# Patient Record
Sex: MALE | Race: WHITE | Employment: OTHER | URBAN - METROPOLITAN AREA
[De-identification: names, ages, dates, MRNs, and addresses within clinical notes are randomized per-mention and may not be internally consistent; named-entity substitution may affect disease eponyms.]

---

## 2017-01-23 DIAGNOSIS — C61 MALIGNANT NEOPLASM OF PROSTATE (H): ICD-10-CM

## 2017-01-23 LAB — PSA SERPL-MCNC: NORMAL UG/L (ref 0–4)

## 2017-01-23 PROCEDURE — 84153 ASSAY OF PSA TOTAL: CPT | Performed by: UROLOGY

## 2017-01-23 PROCEDURE — 36415 COLL VENOUS BLD VENIPUNCTURE: CPT | Performed by: UROLOGY

## 2017-01-25 ENCOUNTER — TELEPHONE (OUTPATIENT)
Dept: FAMILY MEDICINE | Facility: CLINIC | Age: 77
End: 2017-01-25

## 2017-01-25 ENCOUNTER — OFFICE VISIT (OUTPATIENT)
Dept: UROLOGY | Facility: CLINIC | Age: 77
End: 2017-01-25
Payer: COMMERCIAL

## 2017-01-25 ENCOUNTER — RADIANT APPOINTMENT (OUTPATIENT)
Dept: GENERAL RADIOLOGY | Facility: CLINIC | Age: 77
End: 2017-01-25
Attending: UROLOGY
Payer: COMMERCIAL

## 2017-01-25 VITALS — DIASTOLIC BLOOD PRESSURE: 83 MMHG | SYSTOLIC BLOOD PRESSURE: 152 MMHG | RESPIRATION RATE: 14 BRPM | HEART RATE: 78 BPM

## 2017-01-25 DIAGNOSIS — R07.81 RIB PAIN ON LEFT SIDE: ICD-10-CM

## 2017-01-25 DIAGNOSIS — C61 MALIGNANT NEOPLASM OF PROSTATE (H): Primary | ICD-10-CM

## 2017-01-25 PROCEDURE — 99214 OFFICE O/P EST MOD 30 MIN: CPT | Performed by: UROLOGY

## 2017-01-25 PROCEDURE — 71101 X-RAY EXAM UNILAT RIBS/CHEST: CPT | Mod: LT

## 2017-01-25 NOTE — TELEPHONE ENCOUNTER
FYI:  Pt fell on ice on 1/21/17.  Saw  and was having rib pain.   did a CXR and 8th rib is fractured.  Having a fair amount of pain, not able to take a deep breathe and constipated since not able to push.  KpavelRN

## 2017-01-25 NOTE — PROGRESS NOTES
REASON FOR VISIT:  Prostate cancer.      BRIEF COURSE:  Mr. Jaxson Chaidez is a 76-year-old gentleman followed in our clinic for history of prostate cancer.  The patient underwent a robotic-assisted laparoscopic radical prostatectomy on 07/24/2015.  Final pathology demonstrated Shahla 3+4 equals 7, pT3a N0 MX disease with negative margins.  The patient's PSAs have been undetectable to date.  The patient comes in today noting that this past Saturday he fell on the ice while walking his dog.  He notes he has had severe pain in his left ribs posteriorly since that time.  The patient notes that he actually heard a popping sound when he fell.  He notes it hurts to take deep breaths.  He denies any king shortness of breath however.  He denies any gross hematuria.  The patient notes that he has been taking Aleve to help with this pain and that he iced his ribs for the first 3 days and in the last couple days has been using heat.  The patient also notes that he has been having increasing trouble passing his bowel movements due to the inability to generate significant abdominal pressure due to pain.  He has tried taking Citrucel and stool softeners, but is still struggling to have a bowel movement and is feeling uncomfortable from this.      PHYSICAL EXAMINATION:  His blood pressure is 152/83, pulse is 78.  He is in no acute distress but does look mildly uncomfortable.  Inspection however, is posterior back shows no bruising over the area of pain.  Palpation does cause some pain, but there is no palpable hematomas noted.      The patient's PSA from 01/23/2017 is undetectable      ASSESSMENT AND PLAN:  Over half of today's 25-minute visit was spent counseling the patient regarding his prostate cancer and his rib pain.  I suggested to Mr. Chaidez with regard to his prostate cancer are pleased to see his PSA remains undetectable.  We will go ahead and continue with our plan where the patient will recheck his PSA in another 3  months, but we will see him back physically at 6 months, which will be his 2-year anniversary.      In regard to the patient's ribs, I suggested to Mr. Chaidez I suspected he had a rib fracture and in fact we sent the patient down for a chest x-ray with rib series today which did confirm a fracture of the 8th posterior rib with some mild displacement.  I contacted the patient's primary doctor's office and notified them of Mr. Chaidez's rib fracture.  I also encouraged the patient to contact the office independently to let them know as well and to find out what kind of followup is required.        Further, for the patient's constipation, I suggested he try some magnesium citrate as a laxative to help open things up.      Lastly, the patient also notes that he has requested the [facility] to send a letter to us to get further documentation of his prostate cancer diagnosis, treatment and prognosis related to an Agent Orange disability claim he is filing through the [facility].  We will be happy to supply any records needed.         MAL CASE MD             D: 2017 11:05   T: 2017 14:35   MT:       Name:     MICHAEL CHAIDEZ   MRN:      [id number]        Account:      [id number]   :      1940           Visit Date:   2017      Document: [id number]

## 2017-01-25 NOTE — NURSING NOTE
"Chief Complaint   Patient presents with     RECHECK     6-mo follow up       Initial /83 mmHg  Pulse 78  Resp 14 Estimated body mass index is 31.42 kg/(m^2) as calculated from the following:    Height as of 2/15/16: 5' 10\" (1.778 m).    Weight as of 8/30/16: 219 lb (99.338 kg).  BP completed using cuff size: camron Karimi MA      "

## 2017-01-30 ENCOUNTER — MYC MEDICAL ADVICE (OUTPATIENT)
Dept: FAMILY MEDICINE | Facility: CLINIC | Age: 77
End: 2017-01-30

## 2017-01-30 NOTE — TELEPHONE ENCOUNTER
Does not need to be seen unless he is developing problems such as worsening shortness of breath, fever, cough that would suggest a pneumonia. Otherwise it just has to heal and that will take some time

## 2017-01-30 NOTE — TELEPHONE ENCOUNTER
Don states he is having pain, rated at 5 out of 10.   No trouble taking deep breaths at this time. Denies SOB.   Pain relief: taking nothing. Writer suggested aleve or advil if pt is able to tolerate.   States it is getting better.   Going out town to [facility] for a fishing trip on Thursday.   Would like to know if he should be seen or not?   Please advise. Thank you.     1/25/17 X-ray results  Compare 12/28/2014.     FINDINGS: Some aortic tortuosity again seen. Suboptimal inspiration  with mild bibasilar atelectasis, left greater than right. No  pneumothorax. Spine hyperostosis.                                                                       IMPRESSION: Mildly displaced fracture of the left eighth rib  posteriorly.

## 2017-03-14 ENCOUNTER — OFFICE VISIT (OUTPATIENT)
Dept: FAMILY MEDICINE | Facility: CLINIC | Age: 77
End: 2017-03-14
Payer: COMMERCIAL

## 2017-03-14 VITALS
BODY MASS INDEX: 32.17 KG/M2 | SYSTOLIC BLOOD PRESSURE: 124 MMHG | WEIGHT: 224.7 LBS | HEART RATE: 68 BPM | HEIGHT: 70 IN | DIASTOLIC BLOOD PRESSURE: 64 MMHG

## 2017-03-14 DIAGNOSIS — M70.62 TROCHANTERIC BURSITIS OF LEFT HIP: Primary | ICD-10-CM

## 2017-03-14 DIAGNOSIS — C61 MALIGNANT NEOPLASM OF PROSTATE (H): ICD-10-CM

## 2017-03-14 DIAGNOSIS — Z13.6 CARDIOVASCULAR SCREENING; LDL GOAL LESS THAN 160: ICD-10-CM

## 2017-03-14 LAB
ANION GAP SERPL CALCULATED.3IONS-SCNC: 4 MMOL/L (ref 3–14)
BUN SERPL-MCNC: 26 MG/DL (ref 7–30)
CALCIUM SERPL-MCNC: 8.5 MG/DL (ref 8.5–10.1)
CHLORIDE SERPL-SCNC: 102 MMOL/L (ref 94–109)
CHOLEST SERPL-MCNC: 214 MG/DL
CO2 SERPL-SCNC: 31 MMOL/L (ref 20–32)
CREAT SERPL-MCNC: 1.2 MG/DL (ref 0.66–1.25)
GFR SERPL CREATININE-BSD FRML MDRD: 59 ML/MIN/1.7M2
GLUCOSE SERPL-MCNC: 95 MG/DL (ref 70–99)
HDLC SERPL-MCNC: 54 MG/DL
LDLC SERPL CALC-MCNC: 140 MG/DL
NONHDLC SERPL-MCNC: 160 MG/DL
POTASSIUM SERPL-SCNC: 4.4 MMOL/L (ref 3.4–5.3)
SODIUM SERPL-SCNC: 137 MMOL/L (ref 133–144)
TRIGL SERPL-MCNC: 98 MG/DL

## 2017-03-14 PROCEDURE — 20610 DRAIN/INJ JOINT/BURSA W/O US: CPT | Performed by: FAMILY MEDICINE

## 2017-03-14 PROCEDURE — 36415 COLL VENOUS BLD VENIPUNCTURE: CPT | Performed by: FAMILY MEDICINE

## 2017-03-14 PROCEDURE — 80048 BASIC METABOLIC PNL TOTAL CA: CPT | Performed by: FAMILY MEDICINE

## 2017-03-14 PROCEDURE — 80061 LIPID PANEL: CPT | Performed by: FAMILY MEDICINE

## 2017-03-14 PROCEDURE — 99214 OFFICE O/P EST MOD 30 MIN: CPT | Mod: 25 | Performed by: FAMILY MEDICINE

## 2017-03-14 RX ORDER — TRIAMCINOLONE ACETONIDE 40 MG/ML
40 INJECTION, SUSPENSION INTRA-ARTICULAR; INTRAMUSCULAR ONCE
Qty: 1 ML | Refills: 0 | OUTPATIENT
Start: 2017-03-14 | End: 2017-03-14

## 2017-03-14 NOTE — PATIENT INSTRUCTIONS
This could take up to one week to achieve the full benefit, could be repeated in a month, but generally no more than 4 times in one year

## 2017-03-14 NOTE — PROGRESS NOTES
Jaxson,  Your cholesterol levels are actually better than they were 4 years ago and your metabolic panel is fine also      Fredi Bal MD

## 2017-03-14 NOTE — NURSING NOTE
"Chief Complaint   Patient presents with     Hip left     pain X 1-2 weeks. no known injury. pt. had a injury from falling on the ice. pt. broke a rib #8 on the left side.        Initial /64 (BP Location: Right arm, Patient Position: Chair, Cuff Size: Adult Large)  Pulse 68  Ht 5' 10\" (1.778 m)  Wt 224 lb 11.2 oz (101.9 kg)  BMI 32.24 kg/m2 Estimated body mass index is 32.24 kg/(m^2) as calculated from the following:    Height as of this encounter: 5' 10\" (1.778 m).    Weight as of this encounter: 224 lb 11.2 oz (101.9 kg).  Medication Reconciliation: complete     Mago Key, CMA      "

## 2017-03-14 NOTE — PROGRESS NOTES
Subjective:  Jaxson Chaidez is a 77 year old male   Chief Complaint   Patient presents with     Hip left     pain X 1-2 weeks. no known injury. pt. had a injury from falling on the ice. pt. broke a rib #8 on the left side.      About 7 weeks ago he slipped on some ice and fell on a chunk of frozen mud with an injury to his left lateral chest wall. He was seeing the urologist for follow-up of his prostate cancer shortly after that and an x-ray of the ribs was ordered which showed a fracture of his eighth rib. This was treated symptomatically and the urologist recommended that he follow-up with me. The pain in the chest wall has slowly gotten better so that just in the last week he's been able to sleep in his bed rather than a recliner. What brings him in today is he has been having pain in the left hip area, which is most bothersome when he first gets up in the morning, will lessen somewhat after he gets up and around. It is painful whether he is sitting or standing        Encounter Diagnoses   Name Primary?     Trochanteric bursitis of left hip Yes     CARDIOVASCULAR SCREENING; LDL GOAL LESS THAN 160      Malignant neoplasm of prostate (H)       ROS: 5 point ROS neg other than the symptoms noted above in the HPI.      Medical, surgical, social, and family histories, medications and allergies reviewed and updated.    Objective:  Exam:    GENERAL APPEARANCE ADULT: Alert, no acute distress  EYES: PERRL, EOM normal, conjunctiva and lids normal  RESP: lungs clear to auscultation , mild chest wall tenderness over the left eighth rib in the just lateral to the spine  CV: normal rate, regular rhythm, no murmur or gallop  MS: back exam: normal posture, moves about the exam room comfortably, tenderness to palpitation left low back area but this is mild  Left hip exam normal appearance, tenderness over greater trochanter, range of motion normal      ASSESSMENT:  1. Trochanteric bursitis of left hip , possibly brought on  by sleeping in the recliner for several weeks    2. CARDIOVASCULAR SCREENING; LDL GOAL LESS THAN 160    3. Malignant neoplasm of prostate (H) , stable with no evidence of metastatic disease to the bone at this time      Discussed pathophysiology of this condition and implications.  Questions answered.     PLAN:  Orders Placed This Encounter     DRAIN/INJECT LARGE JOINT/BURSA     KENALOG PER 10 MG     Lipid panel reflex to direct LDL     Basic metabolic panel     aspirin 81 MG tablet     triamcinolone acetonide (KENALOG-40) 40 MG/ML injection       After informed consent, the left trochanteric bursa area was prepped with Hibiclens and injected with 1 cc of 1/2 % bupivacaine and 40 mg of Kenalog.  This was well tolerated.     Patient Instructions     This could take up to one week to achieve the full benefit, could be repeated in a month, but generally no more than 4 times in one year

## 2017-03-14 NOTE — MR AVS SNAPSHOT
After Visit Summary   3/14/2017    Jaxson Chaidez    MRN: [id number]           Patient Information     Date Of Birth          1940        Visit Information        Provider Department      3/14/2017 8:40 AM Valeriano, LUCIANO Rai MD [facility]        Today's Diagnoses     Trochanteric bursitis of left hip    -  1      Care Instructions      This could take up to one week to achieve the full benefit, could be repeated in a month, but generally no more than 4 times in one year         Follow-ups after your visit        Your next 10 appointments already scheduled     Jul 26, 2017  9:00 AM CDT   Return Visit with Rk Stephenson MD   [facility]    [address] [address] [phone]              Who to contact     If you have questions or need follow up information about today's clinic visit or your schedule please contact [facility] directly at [phone].  Normal or non-critical lab and imaging results will be communicated to you by [employer], letter or phone within 4 business days after the clinic has received the results. If you do not hear from us within 7 days, please contact the clinic through [employer] or phone. If you have a critical or abnormal lab result, we will notify you by phone as soon as possible.  Submit refill requests through [employer] or call your pharmacy and they will forward the refill request to us. Please allow 3 business days for your refill to be completed.          Additional Information About Your Visit        [employer] Information     [employer] gives you secure access to your electronic health record. If you see a primary care provider, you can also send messages to your care team and make appointments. If you have questions, please call your primary care clinic.  If you do not have a primary care provider, please call [phone] and they will assist you.        Care  "EveryWhere ID     This is your Care EveryWhere ID. This could be used by other organizations to access your [facility] medical records  [id number]        Your Vitals Were     Pulse Height BMI (Body Mass Index)             68 5' 10\" (1.778 m) 32.24 kg/m2          Blood Pressure from Last 3 Encounters:   03/14/17 124/64   01/25/17 152/83   08/30/16 131/75    Weight from Last 3 Encounters:   03/14/17 224 lb 11.2 oz (101.9 kg)   08/30/16 219 lb (99.3 kg)   02/15/16 218 lb (98.9 kg)              Today, you had the following     No orders found for display       Primary Care Provider Office Phone # Fax #    R Fredi Bal [phone]       [place] [address]        Thank you!     Thank you for choosing [facility]  for your care. Our goal is always to provide you with excellent care. Hearing back from our patients is one way we can continue to improve our services. Please take a few minutes to complete the written survey that you may receive in the mail after your visit with us. Thank you!             Your Updated Medication List - Protect others around you: Learn how to safely use, store and throw away your medicines at www.disposemymeds.org.          This list is accurate as of: 3/14/17  9:19 AM.  Always use your most recent med list.                   Brand Name Dispense Instructions for use    ALEVE PO      Take 220 mg by mouth Reported on 3/14/2017       aspirin 81 MG tablet      Take 81 mg by mouth daily       flax seed oil 1000 MG capsule      Take 1 capsule by mouth two times daily.       MULTIVITAL PO      1 tablet by mouth daily         "

## 2017-03-17 ENCOUNTER — MYC MEDICAL ADVICE (OUTPATIENT)
Dept: FAMILY MEDICINE | Facility: CLINIC | Age: 77
End: 2017-03-17

## 2017-03-20 ENCOUNTER — MYC MEDICAL ADVICE (OUTPATIENT)
Dept: FAMILY MEDICINE | Facility: CLINIC | Age: 77
End: 2017-03-20

## 2017-03-20 NOTE — TELEPHONE ENCOUNTER
Dr. Post this is just an FYI.   Pt made an appt for Thursday, 3/23 @ 8:40 am   Thank you and have a nice day.   Cadence DAMIAN RN

## 2017-03-23 ENCOUNTER — OFFICE VISIT (OUTPATIENT)
Dept: FAMILY MEDICINE | Facility: CLINIC | Age: 77
End: 2017-03-23
Payer: COMMERCIAL

## 2017-03-23 ENCOUNTER — MYC MEDICAL ADVICE (OUTPATIENT)
Dept: FAMILY MEDICINE | Facility: CLINIC | Age: 77
End: 2017-03-23

## 2017-03-23 VITALS
DIASTOLIC BLOOD PRESSURE: 74 MMHG | HEART RATE: 60 BPM | HEIGHT: 70 IN | WEIGHT: 220.2 LBS | SYSTOLIC BLOOD PRESSURE: 124 MMHG | BODY MASS INDEX: 31.52 KG/M2

## 2017-03-23 DIAGNOSIS — M54.42 ACUTE MIDLINE LOW BACK PAIN WITH LEFT-SIDED SCIATICA: ICD-10-CM

## 2017-03-23 DIAGNOSIS — K59.09 OTHER CONSTIPATION: Primary | ICD-10-CM

## 2017-03-23 PROCEDURE — 99214 OFFICE O/P EST MOD 30 MIN: CPT | Performed by: FAMILY MEDICINE

## 2017-03-23 NOTE — TELEPHONE ENCOUNTER
Spoke with Elza,wife that she wasn't on his chart to talk too.  Asked if pt was there to get the ok to speak to her.  Pt not available.  Pt to call back to clinic if he has questions.  Jolene

## 2017-03-23 NOTE — PATIENT INSTRUCTIONS
Take one capful of Miralax daily with 8 oz of fluid. Stop the Citrucel and stool softeners. This should help with the abdominal bloating and bowel issues. The sciatica pain should settle down also.

## 2017-03-23 NOTE — PROGRESS NOTES
Subjective:  Jaxson Chaidez is a 77 year old male   Chief Complaint   Patient presents with     Hip left     ongoing pain pt. was seen in clinic 3/14/2017 to see Dr. Bal and had a cortisone injection. pt. states it is not any better.      He was here last week and his primary complaint was lateral hip pain at that time. He was given a steroid injection in the trochanteric bursa of the left hip. He states he is still not feeling well but on further questioning his primary complaint is some bloating and generalized abdominal discomfort. He states his bowel movements have been somewhat variable with occasional episodes of constipation. He has taken some stool softeners and Citrucel. He also has pain in the left sciatic area, not so much in the lateral hip area over the trochanteric bursa like he had last week. He admits to worrying about possible pancreatic cancer with the abdominal pain.      Encounter Diagnoses   Name Primary?     Other constipation Yes     Acute midline low back pain with left-sided sciatica         ROS: 5 point ROS neg other than the symptoms noted above in the HPI.      Medical, surgical, social, and family histories, medications and allergies reviewed and updated.    Objective:  Exam:    GENERAL APPEARANCE ADULT: alert, anxious  EYES: PERRL, EOM normal, conjunctiva and lids normal  RESP: lungs clear to auscultation   CV: normal rate, regular rhythm, no murmur or gallop  ABDOMEN: soft, no organomegaly, masses or tenderness  MS: back exam: normal posture, moves about the exam room comfortably, full ROM, tenderness to palpitation left sciatic notch area, straight leg raise right negative, straight leg raise left negative, reflexes at knees normal symmetrical, reflexes at ankles normal and symmetrical  Left hip exam normal appearance, minimal tenderness over the left trochanteric today; no significant pain or restriction with internal and external rotation either        ASSESSMENT:  1. Other  constipation    2. Acute midline low back pain with left-sided sciatica      I suspect that a lot of his abdominal bloating and discomfort is related to constipation and should respond to switching to Miralax. He has a long-standing history of low back pain with previous lumbar surgery and epidural steroid injections and well documented foraminal stenosis. I suspect his sciatica is related to this, but I think if we can settle down his abdominal symptoms the sciatica will be tolerable. If not, we could proceed with an updated CT scan of the lumbar spine and possible epidural steroid injection    PLAN:        Patient Instructions   Take one capful of Miralax daily with 8 oz of fluid. Stop the Citrucel and stool softeners. This should help with the abdominal bloating and bowel issues. The sciatica pain should settle down also.

## 2017-03-23 NOTE — NURSING NOTE
"Chief Complaint   Patient presents with     Hip left     ongoing pain pt. was seen in clinic 3/14/2017 to see Dr. Bal and had a cortisone injection. pt. states it is not any better.        Initial /74 (BP Location: Right arm, Patient Position: Chair, Cuff Size: Adult Large)  Pulse 60  Ht 5' 10\" (1.778 m)  Wt 220 lb 3.2 oz (99.9 kg)  BMI 31.6 kg/m2 Estimated body mass index is 31.6 kg/(m^2) as calculated from the following:    Height as of this encounter: 5' 10\" (1.778 m).    Weight as of this encounter: 220 lb 3.2 oz (99.9 kg).  Medication Reconciliation: complete     Mago Key, CMA      "

## 2017-04-06 ENCOUNTER — MYC MEDICAL ADVICE (OUTPATIENT)
Dept: FAMILY MEDICINE | Facility: CLINIC | Age: 77
End: 2017-04-06

## 2017-04-06 DIAGNOSIS — S22.32XG CLOSED FRACTURE OF ONE RIB OF LEFT SIDE WITH DELAYED HEALING, SUBSEQUENT ENCOUNTER: ICD-10-CM

## 2017-04-06 DIAGNOSIS — R07.82 INTERCOSTAL PAIN: ICD-10-CM

## 2017-04-06 DIAGNOSIS — M54.16 LUMBAR RADICULOPATHY: Primary | ICD-10-CM

## 2017-04-06 DIAGNOSIS — M48.061 LUMBAR SPINAL STENOSIS: ICD-10-CM

## 2017-04-06 NOTE — TELEPHONE ENCOUNTER
Dr. Bal  Please see [employer] message  LOV 3/23/17  OV notes 3/23/17:  suspect that a lot of his abdominal bloating and discomfort is related to constipation and should respond to switching to Miralax. He has a long-standing history of low back pain with previous lumbar surgery and epidural steroid injections and well documented foraminal stenosis. I suspect his sciatica is related to this, but I think if we can settle down his abdominal symptoms the sciatica will be tolerable. If not, we could proceed with an updated CT scan of the lumbar spine and possible epidural steroid injection    Please advise    Routing to provider.    Pham MOON Rn

## 2017-04-11 ENCOUNTER — MYC MEDICAL ADVICE (OUTPATIENT)
Dept: FAMILY MEDICINE | Facility: CLINIC | Age: 77
End: 2017-04-11

## 2017-04-11 ENCOUNTER — HOSPITAL ENCOUNTER (OUTPATIENT)
Dept: CT IMAGING | Facility: CLINIC | Age: 77
Discharge: HOME | End: 2017-04-11
Attending: FAMILY MEDICINE | Admitting: FAMILY MEDICINE
Payer: MEDICARE

## 2017-04-11 ENCOUNTER — HOSPITAL ENCOUNTER (OUTPATIENT)
Dept: CT IMAGING | Facility: CLINIC | Age: 77
End: 2017-04-11
Attending: FAMILY MEDICINE
Payer: MEDICARE

## 2017-04-11 DIAGNOSIS — M25.552 HIP PAIN, LEFT: Primary | ICD-10-CM

## 2017-04-11 DIAGNOSIS — R07.82 INTERCOSTAL PAIN: ICD-10-CM

## 2017-04-11 DIAGNOSIS — M54.16 LUMBAR RADICULOPATHY: ICD-10-CM

## 2017-04-11 DIAGNOSIS — S22.32XG CLOSED FRACTURE OF ONE RIB OF LEFT SIDE WITH DELAYED HEALING, SUBSEQUENT ENCOUNTER: ICD-10-CM

## 2017-04-11 DIAGNOSIS — M48.061 LUMBAR SPINAL STENOSIS: ICD-10-CM

## 2017-04-11 PROCEDURE — 71250 CT THORAX DX C-: CPT

## 2017-04-11 PROCEDURE — 72131 CT LUMBAR SPINE W/O DYE: CPT

## 2017-04-11 NOTE — PROGRESS NOTES
Jaxson,  The CT scan of the lumbar area shows perhaps some progression of the central stenosis at L3-4. Otherwise no significant change from the previous exam      Fredi Bal MD

## 2017-04-11 NOTE — PROGRESS NOTES
Jaxson,  The CT scan indicates that the rib fractures are healing, although the healing is somewhat slow which would explain your continued pain. Incidentally noted were some very small pulmonary nodules. Since you do not have a history of smoking, you are very low risk for this being anything of concern and no follow-up is needed      Fredi Bal MD

## 2017-04-12 ENCOUNTER — MYC MEDICAL ADVICE (OUTPATIENT)
Dept: FAMILY MEDICINE | Facility: CLINIC | Age: 77
End: 2017-04-12

## 2017-04-12 DIAGNOSIS — M46.1 SI JOINT ARTHRITIS (H): Primary | ICD-10-CM

## 2017-04-12 DIAGNOSIS — M46.1 SACROILIITIS, NOT ELSEWHERE CLASSIFIED (H): ICD-10-CM

## 2017-04-12 NOTE — TELEPHONE ENCOUNTER
Please see [facility] message  Please advise    Routing to provider.    Pham MOON Rn

## 2017-04-14 ENCOUNTER — HOSPITAL ENCOUNTER (OUTPATIENT)
Dept: CT IMAGING | Facility: CLINIC | Age: 77
Discharge: HOME | End: 2017-04-14
Attending: FAMILY MEDICINE | Admitting: FAMILY MEDICINE
Payer: MEDICARE

## 2017-04-14 DIAGNOSIS — M25.552 HIP PAIN, LEFT: ICD-10-CM

## 2017-04-14 PROCEDURE — 73700 CT LOWER EXTREMITY W/O DYE: CPT | Mod: LT

## 2017-04-14 NOTE — PROGRESS NOTES
Jaxson,  The CT scan showed the hip joint itself was normal. 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 I will put in a referral for an injection into the sacroiliac joint on the left. Call [phone] to schedule this      Fredi Bal MD

## 2017-04-20 ENCOUNTER — HOSPITAL ENCOUNTER (OUTPATIENT)
Dept: GENERAL RADIOLOGY | Facility: CLINIC | Age: 77
Discharge: HOME | End: 2017-04-20
Attending: FAMILY MEDICINE | Admitting: FAMILY MEDICINE
Payer: MEDICARE

## 2017-04-20 DIAGNOSIS — M46.1 SACROILIITIS, NOT ELSEWHERE CLASSIFIED (H): ICD-10-CM

## 2017-04-20 DIAGNOSIS — M46.1 SI JOINT ARTHRITIS (H): ICD-10-CM

## 2017-04-20 PROCEDURE — 27210202 XR SACROILIAC THERAPEUTIC INJECTION LEFT

## 2017-04-20 PROCEDURE — 25000128 H RX IP 250 OP 636: Performed by: RADIOLOGY

## 2017-04-20 PROCEDURE — S0020 INJECTION, BUPIVICAINE HYDRO: HCPCS | Performed by: RADIOLOGY

## 2017-04-20 PROCEDURE — 25000125 ZZHC RX 250: Performed by: RADIOLOGY

## 2017-04-20 RX ORDER — BUPIVACAINE HYDROCHLORIDE 2.5 MG/ML
5 INJECTION, SOLUTION EPIDURAL; INFILTRATION; INTRACAUDAL ONCE
Status: COMPLETED | OUTPATIENT
Start: 2017-04-20 | End: 2017-04-20

## 2017-04-20 RX ORDER — LIDOCAINE HYDROCHLORIDE 10 MG/ML
5 INJECTION, SOLUTION EPIDURAL; INFILTRATION; INTRACAUDAL; PERINEURAL ONCE
Status: COMPLETED | OUTPATIENT
Start: 2017-04-20 | End: 2017-04-20

## 2017-04-20 RX ORDER — METHYLPREDNISOLONE ACETATE 40 MG/ML
40 INJECTION, SUSPENSION INTRA-ARTICULAR; INTRALESIONAL; INTRAMUSCULAR; SOFT TISSUE ONCE
Status: COMPLETED | OUTPATIENT
Start: 2017-04-20 | End: 2017-04-20

## 2017-04-20 RX ADMIN — LIDOCAINE HYDROCHLORIDE 50 MG: 10 INJECTION, SOLUTION EPIDURAL; INFILTRATION; INTRACAUDAL; PERINEURAL at 09:05

## 2017-04-20 RX ADMIN — BUPIVACAINE HYDROCHLORIDE 25 MG: 2.5 INJECTION, SOLUTION EPIDURAL; INFILTRATION; INTRACAUDAL at 09:04

## 2017-04-20 RX ADMIN — METHYLPREDNISOLONE ACETATE 40 MG: 40 INJECTION, SUSPENSION INTRA-ARTICULAR; INTRALESIONAL; INTRAMUSCULAR; SOFT TISSUE at 09:04

## 2017-04-20 NOTE — PROGRESS NOTES
RADIOLOGY PROCEDURE NOTE  Patient name: Jaxson Chaidez  MRN: [id number]  : 1940    Pre-procedure diagnosis: Pain.  Post-procedure diagnosis: Same    Procedure Date/Time: 2017  9:41 AM  Procedure: Left SI joint steroid injection.  Estimated blood loss: None  Specimen(s) collected:  None.  The patient tolerated the procedure well with no immediate complications.    See imaging dictation for procedural details.    Provider name: Gold Feliciano  Assistant(s):None

## 2017-04-26 DIAGNOSIS — C61 MALIGNANT NEOPLASM OF PROSTATE (H): ICD-10-CM

## 2017-04-26 LAB — PSA SERPL-MCNC: NORMAL UG/L (ref 0–4)

## 2017-04-26 PROCEDURE — 36415 COLL VENOUS BLD VENIPUNCTURE: CPT | Performed by: UROLOGY

## 2017-04-26 PROCEDURE — 84153 ASSAY OF PSA TOTAL: CPT | Performed by: UROLOGY

## 2017-07-21 DIAGNOSIS — C61 MALIGNANT NEOPLASM OF PROSTATE (H): ICD-10-CM

## 2017-07-21 LAB — PSA SERPL-MCNC: NORMAL UG/L (ref 0–4)

## 2017-07-21 PROCEDURE — 84153 ASSAY OF PSA TOTAL: CPT | Performed by: UROLOGY

## 2017-07-21 PROCEDURE — 36415 COLL VENOUS BLD VENIPUNCTURE: CPT | Performed by: UROLOGY

## 2017-07-26 ENCOUNTER — OFFICE VISIT (OUTPATIENT)
Dept: UROLOGY | Facility: CLINIC | Age: 77
End: 2017-07-26
Payer: COMMERCIAL

## 2017-07-26 VITALS — RESPIRATION RATE: 16 BRPM | SYSTOLIC BLOOD PRESSURE: 151 MMHG | HEART RATE: 80 BPM | DIASTOLIC BLOOD PRESSURE: 93 MMHG

## 2017-07-26 DIAGNOSIS — C61 MALIGNANT NEOPLASM OF PROSTATE (H): Primary | ICD-10-CM

## 2017-07-26 PROCEDURE — 99213 OFFICE O/P EST LOW 20 MIN: CPT | Performed by: UROLOGY

## 2017-07-26 NOTE — NURSING NOTE
"Chief Complaint   Patient presents with     RECHECK     psa       Initial BP (!) 151/93 (BP Location: Left arm, Patient Position: Chair, Cuff Size: Adult Regular)  Pulse 80  Resp 16 Estimated body mass index is 31.6 kg/(m^2) as calculated from the following:    Height as of 3/23/17: 1.778 m (5' 10\").    Weight as of 3/23/17: 99.9 kg (220 lb 3.2 oz).  Medication Reconciliation: complete.   dmitriy sarkar LPN      "

## 2017-07-26 NOTE — PROGRESS NOTES
REASON FOR VISIT:  Prostate cancer.    History of Present Illness:  The patient is a 77-year-old gentleman followed in our clinic for a history of prostate cancer.  The patient underwent a robotic-assisted laparoscopic radical prostatectomy on 07/24/2015.  Final pathology demonstrated Papillion 3 + 4 equals 7, pT3a N0 MX disease with negative margins.  The patient's PSAs have been undetectable to date.  The patient comes in today noting that in general he continues to do well with no complaints at this time.    Physical examination:  His blood pressure is 151/93, pulse is 80.  He is in no acute distress.      Laboratory data:  He has a PSA from 07/21/2017, which is undetectable.    ASSESSMENT AND PLAN:  Over half of today's 15 minute visit was spent counseling the patient regarding his prostate cancer.  I discussed with the patient we are pleased to see that his PSA remains undetectable, now 2 years out from surgery.  We will continue to check PSAs every 6 months for the next couple of years.  The patient can simply call us for the results in 6 months from now for his next PSA, but then will see him back physically in 1 year for his next PSA after that.    The patient inquires if the [facility] has asked us for records with regard to his prostate cancer diagnosis in connection to Agent Orange.  We are unaware of any documents requesting information from us from the [facility] but we did go ahead and print out the patient's operative note, pathology report and last clinic visit note that he can use as documentation regarding his prostate cancer diagnosis.        Rk Stephenson MD    D:  07/26/2017 10:59 T:  07/26/2017 11:55  Document:  [id number] CW\

## 2017-07-26 NOTE — MR AVS SNAPSHOT
After Visit Summary   7/26/2017    Jaxson Chaidez    MRN: [id number]           Patient Information     Date Of Birth          1940        Visit Information        Provider Department      7/26/2017 9:00 AM Rk Stephenson MD [facility]        Today's Diagnoses     Malignant neoplasm of prostate (H)    -  1       Follow-ups after your visit        Your next 10 appointments already scheduled     Jul 26, 2018  9:00 AM CDT   Return Visit with Rk Stephenson MD   [facility]    [address] [address]   [phone]              Future tests that were ordered for you today     Open Future Orders        Priority Expected Expires Ordered    PSA tumor marker Routine 1/26/2018 7/26/2018 7/26/2017            Who to contact     If you have questions or need follow up information about today's clinic visit or your schedule please contact [facility] directly at [phone].  Normal or non-critical lab and imaging results will be communicated to you by AGNITiOhart, letter or phone within 4 business days after the clinic has received the results. If you do not hear from us within 7 days, please contact the clinic through [employer] or phone. If you have a critical or abnormal lab result, we will notify you by phone as soon as possible.  Submit refill requests through [employer] or call your pharmacy and they will forward the refill request to us. Please allow 3 business days for your refill to be completed.          Additional Information About Your Visit        AGNITiOhart Information     [employer] gives you secure access to your electronic health record. If you see a primary care provider, you can also send messages to your care team and make appointments. If you have questions, please call your primary care clinic.  If you do not have a primary care provider, please call [phone] and they will assist  you.        Care EveryWhere ID     This is your Care EveryWhere ID. This could be used by other organizations to access your [place] medical records  [id number]        Your Vitals Were     Pulse Respirations                80 16           Blood Pressure from Last 3 Encounters:   07/26/17 (!) 151/93   03/23/17 124/74   03/14/17 124/64    Weight from Last 3 Encounters:   03/23/17 99.9 kg (220 lb 3.2 oz)   03/14/17 101.9 kg (224 lb 11.2 oz)   08/30/16 99.3 kg (219 lb)               Primary Care Provider Office Phone # Fax #    R Fredi Bal [phone]       [place] [address]        Equal Access to Services     JOE JENSEN : Hadii jose rafael garcia hadasho Soomaali, waaxda luqadaha, qaybta kaalmada adeegyada, mariely olivarez. So [facility] [phone].    ATENCIÓN: Si habla español, tiene a barriga disposición servicios gratuitos de asistencia lingüística. Llame al [phone].    We comply with applicable federal civil rights laws and [place] laws. We do not discriminate on the basis of race, color, national origin, age, disability sex, sexual orientation or gender identity.            Thank you!     Thank you for choosing [facility]  for your care. Our goal is always to provide you with excellent care. Hearing back from our patients is one way we can continue to improve our services. Please take a few minutes to complete the written survey that you may receive in the mail after your visit with us. Thank you!             Your Updated Medication List - Protect others around you: Learn how to safely use, store and throw away your medicines at www.disposemymeds.org.          This list is accurate as of: 7/26/17 10:00 AM.  Always use your most recent med list.                   Brand Name Dispense Instructions for use Diagnosis    ALEVE PO      Take 220 mg by mouth Reported on 3/14/2017        aspirin 81 MG tablet      Take 81 mg by  mouth daily        flax seed oil 1000 MG capsule      Take 1 capsule by mouth two times daily.        MULTIVITAL PO      1 tablet by mouth daily

## 2017-12-04 ENCOUNTER — OFFICE VISIT (OUTPATIENT)
Dept: FAMILY MEDICINE | Facility: CLINIC | Age: 77
End: 2017-12-04
Payer: COMMERCIAL

## 2017-12-04 VITALS
SYSTOLIC BLOOD PRESSURE: 122 MMHG | WEIGHT: 215 LBS | HEART RATE: 72 BPM | DIASTOLIC BLOOD PRESSURE: 62 MMHG | BODY MASS INDEX: 30.85 KG/M2

## 2017-12-04 DIAGNOSIS — Z23 NEED FOR VACCINATION: ICD-10-CM

## 2017-12-04 DIAGNOSIS — Z12.11 SCREEN FOR COLON CANCER: ICD-10-CM

## 2017-12-04 DIAGNOSIS — R10.32 LLQ ABDOMINAL PAIN: Primary | ICD-10-CM

## 2017-12-04 PROCEDURE — 90471 IMMUNIZATION ADMIN: CPT | Performed by: FAMILY MEDICINE

## 2017-12-04 PROCEDURE — 90715 TDAP VACCINE 7 YRS/> IM: CPT | Performed by: FAMILY MEDICINE

## 2017-12-04 PROCEDURE — 99213 OFFICE O/P EST LOW 20 MIN: CPT | Mod: 25 | Performed by: FAMILY MEDICINE

## 2017-12-04 NOTE — NURSING NOTE
"Chief Complaint   Patient presents with     Abdominal Pain     pain in the right side, ? gallstones per x ray, problem with bowel movements, hemorrhoids off and on for some time now       Initial /62 (Cuff Size: Adult Large)  Pulse 72  Wt 215 lb (97.5 kg)  BMI 30.85 kg/m2 Estimated body mass index is 30.85 kg/(m^2) as calculated from the following:    Height as of 3/23/17: 5' 10\" (1.778 m).    Weight as of this encounter: 215 lb (97.5 kg).  Medication Reconciliation: complete   Silvana Card CMA      "

## 2017-12-04 NOTE — PATIENT INSTRUCTIONS
Thank you for choosing [facility].  You may be receiving a survey in the mail from [facility] regarding your visit today.  Please take a few minutes to complete and return the survey to let us know how we are doing.      Our Clinic hours are:  Mondays    7:20 am - 7 pm  Tues -  Fri  7:20 am - 5 pm    Clinic Phone: [phone]    The clinic lab opens at 7:30 am Mon - Fri and appointments are required.    [facility]. [phone]  Monday-Thursday 8 am - 7pm  Tues/Wed/Fri 8 am - 5:30 pm

## 2017-12-04 NOTE — MR AVS SNAPSHOT
After Visit Summary   12/4/2017    Jaxson Chaidez    MRN: [id number]           Patient Information     Date Of Birth          1940        Visit Information        Provider Department      12/4/2017 9:00 AM LUCIANO Bal MD [facility]        Today's Diagnoses     Need for vaccination    -  1    Screen for colon cancer        LLQ abdominal pain          Care Instructions          Thank you for choosing [facility].  You may be receiving a survey in the mail from [facility] Santiago regarding your visit today.  Please take a few minutes to complete and return the survey to let us know how we are doing.      Our Clinic hours are:  Mondays    7:20 am - 7 pm  Tues -  Fri  7:20 am - 5 pm    Clinic Phone: [phone]    The clinic lab opens at 7:30 am Mon - Fri and appointments are required.    [facility] [place]  Ph. [phone]  Monday-Thursday 8 am - 7pm  Tues/Wed/Fri 8 am - 5:30 pm                 Follow-ups after your visit        Additional Services     GASTROENTEROLOGY ADULT REF PROCEDURE ONLY       Last Lab Result: Creatinine (mg/dL)       Date                     Value                 03/14/2017               1.20             ----------  Body mass index is 30.85 kg/(m^2).     Needed:  No  Language:  English    Patient will be contacted to schedule procedure.     Please be aware that coverage of these services is subject to the terms and limitations of your health insurance plan.  Call member services at your health plan with any benefit or coverage questions.  Any procedures must be performed at a [facility] facility OR coordinated by your clinic's referral office.    Please bring the following with you to your appointment:    (1) Any X-Rays, CTs or MRIs which have been performed.  Contact the facility where they were done to arrange for  prior to your scheduled appointment.    (2) List of current medications   (3) This referral request   (4) Any  documents/labs given to you for this referral                  Your next 10 appointments already scheduled     Jul 26, 2018  9:00 AM CDT   Return Visit with Rk Stephenson MD   [facility]    [address] [address] [phone]              Who to contact     If you have questions or need follow up information about today's clinic visit or your schedule please contact [facility] directly at [phone].  Normal or non-critical lab and imaging results will be communicated to you by frentinghart, letter or phone within 4 business days after the clinic has received the results. If you do not hear from us within 7 days, please contact the clinic through [employer] or phone. If you have a critical or abnormal lab result, we will notify you by phone as soon as possible.  Submit refill requests through [employer] or call your pharmacy and they will forward the refill request to us. Please allow 3 business days for your refill to be completed.          Additional Information About Your Visit        frentinghart Information     [employer] gives you secure access to your electronic health record. If you see a primary care provider, you can also send messages to your care team and make appointments. If you have questions, please call your primary care clinic.  If you do not have a primary care provider, please call [phone] and they will assist you.        Care EveryWhere ID     This is your Care EveryWhere ID. This could be used by other organizations to access your [place] medical records  [id number]        Your Vitals Were     Pulse BMI (Body Mass Index)                72 30.85 kg/m2           Blood Pressure from Last 3 Encounters:   12/04/17 122/62   07/26/17 (!) 151/93   03/23/17 124/74    Weight from Last 3 Encounters:   12/04/17 215 lb (97.5 kg)   03/23/17 220 lb 3.2 oz (99.9 kg)   03/14/17 224 lb 11.2 oz (101.9 kg)              We  Performed the Following     1st  Administration  [98067]     GASTROENTEROLOGY ADULT REF PROCEDURE ONLY     TDAP VACCINE (ADACEL) [24795.002]        Primary Care Provider Office Phone # Fax #    R Fredi Bal [phone] [address] [address]        Equal Access to Services     ARYAOZ CAMILA : Hadii aad ku hadasho Soomaali, waaxda luqadaha, qaybta kaalmada adeegyada, waxay idiin hayaan adeeg michael laclairen ah. So [facility] [phone].    ATENCIÓN: Si habla español, tiene a barriga disposición servicios gratuitos de asistencia lingüística. Llame al [phone].    We comply with applicable federal civil rights laws and [place] laws. We do not discriminate on the basis of race, color, national origin, age, disability, sex, sexual orientation, or gender identity.            Thank you!     Thank you for choosing [facility]  for your care. Our goal is always to provide you with excellent care. Hearing back from our patients is one way we can continue to improve our services. Please take a few minutes to complete the written survey that you may receive in the mail after your visit with us. Thank you!             Your Updated Medication List - Protect others around you: Learn how to safely use, store and throw away your medicines at www.disposemymeds.org.          This list is accurate as of: 12/4/17  9:43 AM.  Always use your most recent med list.                   Brand Name Dispense Instructions for use Diagnosis    ALEVE PO      Take 220 mg by mouth Reported on 3/14/2017        aspirin 81 MG tablet      Take 81 mg by mouth daily        flax seed oil 1000 MG capsule      Take 1 capsule by mouth two times daily.        MULTIVITAL PO      1 tablet by mouth daily

## 2017-12-04 NOTE — PROGRESS NOTES
SUBJECTIVE:   Jaxson Chaidez is a 77 year old male who presents to clinic today for the following health issues:      Chief Complaint   Patient presents with     Abdominal Pain     pain in the left side, ? gallstones per x ray, problem with bowel movements, hemorrhoids off and on for some time now     He describes a moderate discomfort in the left lower quadrant that is sometimes worse after eating and has been persistent. He was noted incidentally to have some gallstones on the CT scan a while ago but the gallbladder otherwise appeared normal. He's never had colicky right upper quadrant pain or epigastric pain to suggest gallbladder disease. He states that his bowel movements are fairly formed and soft and his appetite has been good. He had a colonoscopy 9 years ago which was normal and there was no mention of diverticulosis. However he's had a brother who had several colon polyps removed. He admits to being a little concerned about this and is requesting to have a colonoscopy done earlier than scheduled to evaluate this          Problem list and histories reviewed & adjusted, as indicated.  Additional history: none        Reviewed and updated as needed this visit by clinical staff  Tobacco  Allergies  Meds  Med Hx  Surg Hx  Fam Hx  Soc Hx      Reviewed and updated as needed this visit by Provider               ROS:  Constitutional, HEENT, cardiovascular, pulmonary, gi and gu systems are negative, except as otherwise noted.          OBJECTIVE:                                                    /62 (Cuff Size: Adult Large)  Pulse 72  Wt 215 lb (97.5 kg)  BMI 30.85 kg/m2    GENERAL: healthy, alert and no distress  EYES: Eyes grossly normal to inspection, extraocular movements - intact, and PERRL  NECK: no tenderness, no adenopathy, no asymmetry, no masses, no stiffness; thyroid- normal to palpation  RESP: lungs clear to auscultation - no rales, no rhonchi, no wheezes  CV: regular rates and  rhythm, normal S1 S2, no S3 or S4 and no murmur, no click or rub -  ABDOMEN: Mild tenderness left lower quadrant, no mass, guarding, rigidity, or rebound; Upper abdominal exam is benign  MS: extremities- no gross deformities noted, no edema         ASSESSMENT/PLAN:                                                    ASSESSMENT:  1. LLQ abdominal pain    2. Screen for colon cancer    3. Need for vaccination        PLAN:  Orders Placed This Encounter     TDAP VACCINE (ADACEL) [40924.002]     1st  Administration  [[id number]]     GASTROENTEROLOGY ADULT REF PROCEDURE ONLY for colonoscopy        Patient Instructions     Schedule colonoscopy when you can    Thank you for choosing [facility].  You may be receiving a survey in the mail from [employer] regarding your visit today.  Please take a few minutes to complete and return the survey to let us know how we are doing.      Our Clinic hours are:  Mondays    7:20 am - 7 pm  Tues -  Fri  7:20 am - 5 pm    Clinic Phone: [phone]    The clinic lab opens at 7:30 am Mon - Fri and appointments are required.    [facility] [place]  Ph. [phone]  Monday-Thursday 8 am - 7pm  Tues/Wed/Fri 8 am - 5:30 pm             LUCIANO Rai Post  [facility]

## 2018-01-02 ENCOUNTER — ANESTHESIA EVENT (OUTPATIENT)
Dept: GASTROENTEROLOGY | Facility: CLINIC | Age: 78
End: 2018-01-02
Payer: MEDICARE

## 2018-01-02 NOTE — ANESTHESIA PREPROCEDURE EVALUATION
Anesthesia Evaluation     . Pt has had prior anesthetic. Type: General and MAC           ROS/MED HX    ENT/Pulmonary:  - neg pulmonary ROS     Neurologic:  - neg neurologic ROS   (+)other neuro hearing loss - cochlear implant    Cardiovascular:  - neg cardiovascular ROS   (+) ----. : . . . :. . Previous cardiac testing date:results:Stress Testdate:3/2014 results:Order     NM Exercise stress test (nuc card) ([id number]) (Order [id number])      Exam Information     Exam Date Exam Time Accession # Performing Department Results     3/12/14  2:31 PM [id number] [facility] Nuclear Medicine      [employer] Interactive Report and InfoRx     View the interactive report     PACS Images     Show images for NM Exercise stress test (nuc card)     Study Result     GATED EXERCISE MYOCARDIAL PERFUSION SCINTIGRAPHY       3/12/2014 2:31 PM ,MICHAEL HEARD 74 years Male. 1940        Indication/Clinical History: Chest pain     Impression     I   Exercise Test       1.  The patient exercised to a adequate cardiac workload with                  Good functional aerobic capacity demonstrated.       2. The patient experienced no chest pain during the test.       3. EKG report done under separate cover.     II    Myocardial Perfusion Scintigraphy  1.  Myocardial perfusion imaging using single isotope technique  demonstrated normal myocardial perfusion.   2. Gated images demonstrated normal wall motion.  The left ventricular  systolic function is normal with a calculated ejection fraction of  64%.     Procedure     The patient performed treadmill exercise using a Chidi protocol,  completing 7 minutes and 32 seconds with an estimated workload of 9.3  METS.  The test was terminated due to fatigue. The heart rate was 60  beats per minute at baseline and increased to 151 beats at peak  exercise, which was 102% of the maximum predicted heart rate. The rest  blood pressure was 140/80 mm/Hg and peak blood pressure is  180/82mm/Hg  with rate pressure product of 27,000. The patient experienced no chest  pain during the test. The patient was not on a beta blocker.     Myocardial perfusion imaging was performed at rest, approximately 45  minutes after the injection intravenously of 10.6of Tc-99m Myoview. At  peak exercise, the patient was injected intravenously with 32.9 mCi of   Tc-99m Myoview and exercise continued for approximately 1 minute.  Gated post-stress tomographic imaging was performed approximately 30  minutes after stress     EKG Findings  EKG report done under separate cover.     Tomographic Findings  Overall, the study quality is good . On the stress images, no  significant perfusion defects were seen. On the rest images,perfusion  was normal . Gated images demonstrated normal wall motion. The left  ventricular ejection fraction was calculated to be 64%. TID was  absent.              TARIQ PACK MD    ECG reviewed date:7/2014 results:SR date: results:          METS/Exercise Tolerance:  >4 METS   Hematologic:  - neg hematologic  ROS       Musculoskeletal:   (+) , , other musculoskeletal- cervicalgia , left knee DJD --s/p TKA      GI/Hepatic:  - neg GI/hepatic ROS      (-) liver disease   Renal/Genitourinary:  - ROS Renal section negative       Endo:  - neg endo ROS       Psychiatric:         Infectious Disease:  - neg infectious disease ROS       Malignancy:   (+) Malignancy History of Prostate  Prostate CA status post Surgery,         Other:    - neg other ROS                 Physical Exam  Normal systems: cardiovascular, pulmonary and dental    Airway   Mallampati: II    Dental     Cardiovascular       Pulmonary                     Anesthesia Plan      History & Physical Review  History and physical reviewed and following examination; no interval change.    ASA Status:  2 .    NPO Status:  > 6 hours    Plan for MAC Reason for MAC:  Deep or markedly invasive procedure (G8)         Postoperative  Care      Consents  Anesthetic plan, risks, benefits and alternatives discussed with:  Patient..                          .

## 2018-01-12 ENCOUNTER — HOSPITAL ENCOUNTER (OUTPATIENT)
Facility: CLINIC | Age: 78
Discharge: HOME | End: 2018-01-12
Attending: SURGERY | Admitting: SURGERY
Payer: MEDICARE

## 2018-01-12 ENCOUNTER — SURGERY (OUTPATIENT)
Age: 78
End: 2018-01-12
Payer: COMMERCIAL

## 2018-01-12 ENCOUNTER — ANESTHESIA (OUTPATIENT)
Dept: GASTROENTEROLOGY | Facility: CLINIC | Age: 78
End: 2018-01-12
Payer: MEDICARE

## 2018-01-12 VITALS
SYSTOLIC BLOOD PRESSURE: 162 MMHG | TEMPERATURE: 97.8 F | WEIGHT: 200 LBS | RESPIRATION RATE: 18 BRPM | HEART RATE: 73 BPM | DIASTOLIC BLOOD PRESSURE: 84 MMHG | BODY MASS INDEX: 28.63 KG/M2 | OXYGEN SATURATION: 97 % | HEIGHT: 70 IN

## 2018-01-12 LAB — COLONOSCOPY: NORMAL

## 2018-01-12 PROCEDURE — 37000009 ZZH ANESTHESIA TECHNICAL FEE, EACH ADDTL 15 MIN: Performed by: SURGERY

## 2018-01-12 PROCEDURE — 88305 TISSUE EXAM BY PATHOLOGIST: CPT | Mod: 26 | Performed by: SURGERY

## 2018-01-12 PROCEDURE — 45384 COLONOSCOPY W/LESION REMOVAL: CPT | Mod: PT | Performed by: SURGERY

## 2018-01-12 PROCEDURE — 45384 COLONOSCOPY W/LESION REMOVAL: CPT | Performed by: SURGERY

## 2018-01-12 PROCEDURE — 37000008 ZZH ANESTHESIA TECHNICAL FEE, 1ST 30 MIN: Performed by: SURGERY

## 2018-01-12 PROCEDURE — 25000128 H RX IP 250 OP 636: Performed by: NURSE ANESTHETIST, CERTIFIED REGISTERED

## 2018-01-12 PROCEDURE — 25000125 ZZHC RX 250: Performed by: NURSE ANESTHETIST, CERTIFIED REGISTERED

## 2018-01-12 PROCEDURE — 25000125 ZZHC RX 250: Performed by: SURGERY

## 2018-01-12 PROCEDURE — 88305 TISSUE EXAM BY PATHOLOGIST: CPT | Performed by: SURGERY

## 2018-01-12 PROCEDURE — 25000128 H RX IP 250 OP 636: Performed by: SURGERY

## 2018-01-12 RX ORDER — GLYCOPYRROLATE 0.2 MG/ML
INJECTION, SOLUTION INTRAMUSCULAR; INTRAVENOUS PRN
Status: DISCONTINUED | OUTPATIENT
Start: 2018-01-12 | End: 2018-01-12

## 2018-01-12 RX ORDER — SODIUM CHLORIDE, SODIUM LACTATE, POTASSIUM CHLORIDE, CALCIUM CHLORIDE 600; 310; 30; 20 MG/100ML; MG/100ML; MG/100ML; MG/100ML
INJECTION, SOLUTION INTRAVENOUS CONTINUOUS
Status: DISCONTINUED | OUTPATIENT
Start: 2018-01-12 | End: 2018-01-12 | Stop reason: HOSPADM

## 2018-01-12 RX ORDER — LIDOCAINE 40 MG/G
CREAM TOPICAL
Status: DISCONTINUED | OUTPATIENT
Start: 2018-01-12 | End: 2018-01-12 | Stop reason: HOSPADM

## 2018-01-12 RX ORDER — PROPOFOL 10 MG/ML
INJECTION, EMULSION INTRAVENOUS PRN
Status: DISCONTINUED | OUTPATIENT
Start: 2018-01-12 | End: 2018-01-12

## 2018-01-12 RX ORDER — ONDANSETRON 2 MG/ML
4 INJECTION INTRAMUSCULAR; INTRAVENOUS
Status: DISCONTINUED | OUTPATIENT
Start: 2018-01-12 | End: 2018-01-12 | Stop reason: HOSPADM

## 2018-01-12 RX ORDER — PROPOFOL 10 MG/ML
INJECTION, EMULSION INTRAVENOUS CONTINUOUS PRN
Status: DISCONTINUED | OUTPATIENT
Start: 2018-01-12 | End: 2018-01-12

## 2018-01-12 RX ADMIN — GLYCOPYRROLATE 0.2 MG: 0.2 INJECTION, SOLUTION INTRAMUSCULAR; INTRAVENOUS at 10:06

## 2018-01-12 RX ADMIN — SODIUM CHLORIDE, POTASSIUM CHLORIDE, SODIUM LACTATE AND CALCIUM CHLORIDE: 600; 310; 30; 20 INJECTION, SOLUTION INTRAVENOUS at 08:33

## 2018-01-12 RX ADMIN — LIDOCAINE HYDROCHLORIDE 5 ML: 10 INJECTION, SOLUTION EPIDURAL; INFILTRATION; INTRACAUDAL; PERINEURAL at 08:34

## 2018-01-12 RX ADMIN — PROPOFOL 30 MG: 10 INJECTION, EMULSION INTRAVENOUS at 10:06

## 2018-01-12 RX ADMIN — PROPOFOL 30 MG: 10 INJECTION, EMULSION INTRAVENOUS at 10:07

## 2018-01-12 RX ADMIN — PROPOFOL 10 MG: 10 INJECTION, EMULSION INTRAVENOUS at 10:08

## 2018-01-12 RX ADMIN — PROPOFOL 30 MG: 10 INJECTION, EMULSION INTRAVENOUS at 10:05

## 2018-01-12 RX ADMIN — PROPOFOL 200 MCG/KG/MIN: 10 INJECTION, EMULSION INTRAVENOUS at 10:08

## 2018-01-12 NOTE — BRIEF OP NOTE
[facility]   Brief Operative Note    Pre-operative diagnosis: screening   Post-operative diagnosis diverticulosis, one polyp   Procedure: Procedure(s):  colonoscopy - Wound Class: II-Clean Contaminated   Surgeon(s): Surgeon(s) and Role:     * Christopher Ron MD - Primary   Estimated blood loss: * No values recorded between 1/12/2018 12:00 AM and 1/12/2018 10:30 AM *    Specimens:   ID Type Source Tests Collected by Time Destination   A : Rectal Polyp Polyp Large Intestine, Rectum SURGICAL PATHOLOGY EXAM Christopher Ron MD 1/12/2018 10:25 AM       Findings: 1. Mild sigmoid diverticulosis  2. Single rectal polyp - biopsied and cauterized  3. Colon otherwise normal

## 2018-01-12 NOTE — ANESTHESIA POSTPROCEDURE EVALUATION
Patient: Jaxson Chaidez    Procedure(s):  colonoscopy - Wound Class: II-Clean Contaminated    Diagnosis:screening  Diagnosis Additional Information: No value filed.    Anesthesia Type:  MAC    Note:  Anesthesia Post Evaluation    Patient location during evaluation: Phase 2 and Bedside  Patient participation: Able to fully participate in evaluation  Level of consciousness: awake  Pain management: adequate  Airway patency: patent  Cardiovascular status: acceptable  Respiratory status: acceptable  Hydration status: acceptable  PONV: none     Anesthetic complications: None          Last vitals:  Vitals:    01/12/18 0825 01/12/18 1037   BP: 134/82 113/80   Pulse: 73    Resp: 16 16   Temp: 36.6  C (97.8  F)    SpO2: 97% 97%         Electronically Signed By: Abhijeet Montez CRNA, APRN CRNA  January 12, 2018  10:42 AM

## 2018-01-12 NOTE — ANESTHESIA CARE TRANSFER NOTE
Patient: Jaxson Chaidez    Procedure(s):  colonoscopy - Wound Class: II-Clean Contaminated    Diagnosis: screening  Diagnosis Additional Information: No value filed.    Anesthesia Type:   MAC     Note:  Airway :Room Air  Patient transferred to:Phase II  Handoff Report: Identifed the Patient, Identified the Reponsible Provider, Reviewed the pertinent medical history, Discussed the surgical course, Reviewed Intra-OP anesthesia mangement and issues during anesthesia, Set expectations for post-procedure period and Allowed opportunity for questions and acknowledgement of understanding      Vitals: (Last set prior to Anesthesia Care Transfer)    CRNA VITALS  1/12/2018 1002 - 1/12/2018 1042      1/12/2018             Pulse: 61    Ht Rate: 63    SpO2: 99 %                Electronically Signed By: Abhijeet Montez CRNA, APRN CRNA  January 12, 2018  10:42 AM

## 2018-01-12 NOTE — H&P
"77 year old year old male here for colonoscopy for screening.    Patient Active Problem List   Diagnosis     Hearing loss     Cervicalgia     CARDIOVASCULAR SCREENING; LDL GOAL LESS THAN 160     Advanced directives, counseling/discussion     Malignant neoplasm of prostate (H)     Status post total left knee replacement     Left knee DJD     Senile nuclear sclerosis     Prostate cancer (H)       Past Medical History:   Diagnosis Date     Arthritis      Prostate cancer (H)        Past Surgical History:   Procedure Laterality Date     ARTHROPLASTY KNEE Left 2/25/2015    Procedure: ARTHROPLASTY KNEE;  Surgeon: Emery Lincoln MD;  Location: [facility] OR     BIOPSY      Prostate     COLONOSCOPY       DAVINCI LYMPHADENECTOMY Bilateral 7/24/2015    Procedure: DAVINCI LYMPHADENECTOMY;  Surgeon: Rk Stephenson MD;  Location: [facility] OR     DAVINCI PROSTATECTOMY N/A 7/24/2015    Procedure: DAVINCI PROSTATECTOMY;  Surgeon: Rk Stephenson MD;  Location:  OR     ENT SURGERY Right 2007    cochlear implant     INJECT EPIDURAL LUMBAR  3/1/2011    INJECT EPIDURAL LUMBAR performed by GENERIC ANESTHESIA PROVIDER at [facility] OR     PHACOEMULSIFICATION WITH STANDARD INTRAOCULAR LENS IMPLANT Left 5/21/2015    Procedure: PHACOEMULSIFICATION WITH STANDARD INTRAOCULAR LENS IMPLANT;  Surgeon: Romero Funk MD;  Location: [facility] OR     SURGICAL HISTORY OF -   01/21/2002    [facility]       [facility]    No current outpatient prescriptions on file.       No Known Allergies    Pt reports that he has never smoked. He has never used smokeless tobacco. He reports that he drinks alcohol. He reports that he does not use illicit drugs.    Exam:  /82 (BP Location: Right arm)  Pulse 73  Temp 97.8  F (36.6  C) (Oral)  Resp 16  Ht 1.778 m (5' 10\")  Wt 90.7 kg (200 lb)  SpO2 97%  BMI 28.7 kg/m2    Awake, Alert OX3  Lungs - CTA bilaterally  CV - RRR, no murmurs, distal pulses intact  Abd - soft, non-distended, non-tender, +BS  Extr " - No cyanosis or edema    A/P 77 year old year old male in need of colonoscopy for screening. Risks, benefits, alternatives, and complications were discussed including the possibility of perforation and the patient agreed to proceed    Christopher Ron MD

## 2018-01-15 LAB — COPATH REPORT: NORMAL

## 2018-01-26 DIAGNOSIS — C61 MALIGNANT NEOPLASM OF PROSTATE (H): ICD-10-CM

## 2018-01-26 LAB — PSA SERPL-MCNC: <0.01 UG/L (ref 0–4)

## 2018-01-26 PROCEDURE — 84153 ASSAY OF PSA TOTAL: CPT | Performed by: UROLOGY

## 2018-01-26 PROCEDURE — 36415 COLL VENOUS BLD VENIPUNCTURE: CPT | Performed by: UROLOGY

## 2018-07-19 ENCOUNTER — DOCUMENTATION ONLY (OUTPATIENT)
Dept: LAB | Facility: CLINIC | Age: 78
End: 2018-07-19

## 2018-07-19 NOTE — PROGRESS NOTES
This patient is coming in on 2018 for a PSA. His standing ordered has . Please place a new standing order. Thank you.

## 2018-07-20 DIAGNOSIS — Z85.46 PERSONAL HISTORY OF MALIGNANT NEOPLASM OF PROSTATE: Primary | ICD-10-CM

## 2018-07-20 DIAGNOSIS — Z85.46 PERSONAL HISTORY OF MALIGNANT NEOPLASM OF PROSTATE: ICD-10-CM

## 2018-07-20 LAB — PSA SERPL-MCNC: <0.01 UG/L (ref 0–4)

## 2018-07-20 PROCEDURE — 36415 COLL VENOUS BLD VENIPUNCTURE: CPT | Performed by: UROLOGY

## 2018-07-20 PROCEDURE — 84153 ASSAY OF PSA TOTAL: CPT | Performed by: UROLOGY

## 2018-07-26 ENCOUNTER — OFFICE VISIT (OUTPATIENT)
Dept: UROLOGY | Facility: CLINIC | Age: 78
End: 2018-07-26
Payer: COMMERCIAL

## 2018-07-26 VITALS — RESPIRATION RATE: 16 BRPM | HEART RATE: 69 BPM | DIASTOLIC BLOOD PRESSURE: 86 MMHG | SYSTOLIC BLOOD PRESSURE: 149 MMHG

## 2018-07-26 DIAGNOSIS — C61 MALIGNANT NEOPLASM OF PROSTATE (H): Primary | ICD-10-CM

## 2018-07-26 PROCEDURE — 99213 OFFICE O/P EST LOW 20 MIN: CPT | Performed by: UROLOGY

## 2018-07-26 NOTE — MR AVS SNAPSHOT
After Visit Summary   7/26/2018    Jaxson Chaidez    MRN: [id number]           Patient Information     Date Of Birth          1940        Visit Information        Provider Department      7/26/2018 9:00 AM Rk Stephenson MD [facility]        Today's Diagnoses     Malignant neoplasm of prostate (H)    -  1       Follow-ups after your visit        Your next 10 appointments already scheduled     Jul 25, 2019  9:00 AM CDT   Return Visit with Rk Stephenson MD   [facility] ([facility])    [address] [address]   [phone]              Who to contact     If you have questions or need follow up information about today's clinic visit or your schedule please contact [facility] directly at [phone].  Normal or non-critical lab and imaging results will be communicated to you by MyChart, letter or phone within 4 business days after the clinic has received the results. If you do not hear from us within 7 days, please contact the clinic through [employer] or phone. If you have a critical or abnormal lab result, we will notify you by phone as soon as possible.  Submit refill requests through [facility] or call your pharmacy and they will forward the refill request to us. Please allow 3 business days for your refill to be completed.          Additional Information About Your Visit        MyChart Information     [facility] gives you secure access to your electronic health record. If you see a primary care provider, you can also send messages to your care team and make appointments. If you have questions, please call your primary care clinic.  If you do not have a primary care provider, please call [phone] and they will assist you.        Care EveryWhere ID     This is your Care EveryWhere ID. This could be used by other organizations to access your [facility] medical records  [id number]        Your  Vitals Were     Pulse Respirations                69 16           Blood Pressure from Last 3 Encounters:   07/26/18 149/86   01/12/18 162/84   12/04/17 122/62    Weight from Last 3 Encounters:   01/12/18 90.7 kg (200 lb)   12/04/17 97.5 kg (215 lb)   03/23/17 99.9 kg (220 lb 3.2 oz)               Primary Care Provider Office Phone # Fax #    R Fredi Bal [phone] [address] [facility] [address]        Equal Access to Services     [facility] : Hadii aad ku hadasho Soomaali, waaxda luqadaha, qaybta kaalmada tatyana, mariely ivan . So [place] -[phone].    ATENCIÓN: Si habla español, tiene a barriga disposición servicios gratuitos de asistencia lingüística. Ll[facility] [phone].    We comply with applicable federal civil rights laws and [place] laws. We do not discriminate on the basis of race, color, national origin, age, disability, sex, sexual orientation, or gender identity.            Thank you!     Thank you for choosing [facility]  for your care. Our goal is always to provide you with excellent care. Hearing back from our patients is one way we can continue to improve our services. Please take a few minutes to complete the written survey that you may receive in the mail after your visit with us. Thank you!             Your Updated Medication List - Protect others around you: Learn how to safely use, store and throw away your medicines at www.disposemymeds.org.          This list is accurate as of 7/26/18 11:59 PM.  Always use your most recent med list.                   Brand Name Dispense Instructions for use Diagnosis    ALEVE PO      Take 220 mg by mouth Reported on 3/14/2017        aspirin 81 MG tablet      Take 81 mg by mouth daily        flax seed oil 1000 MG capsule      Take 1 capsule by mouth two times daily.        MULTIVITAL PO      1 tablet by mouth daily

## 2018-07-26 NOTE — PROGRESS NOTES
Visit Date:   2018      REASON FOR VISIT:  Prostate cancer.      BRIEF COURSE:  Mr. Chaidez is a 78-year-old gentleman followed in our clinic for a history of prostate cancer.  The patient underwent a robotic-assisted laparoscopic radical prostatectomy on 2015.  Final pathology demonstrates Shahla 3+4=7, [id number] disease with negative margins.  The patient's PSAs have been undetectable to date.  The patient comes in today noting he continues to do well.  He has no complaints currently.      PHYSICAL EXAMINATION:   VITAL SIGNS:  His blood pressure is 149/86, pulse is 69.   GENERAL:  He is in no acute distress.      LABORATORY DATA:  His PSA from 2018 is undetectable      ASSESSMENT AND PLAN:  Over half of today's 15-minute visit was spent counseling the patient regarding his prostate cancer.  I suggested to Mr. Chaidez we are pleased to see his PSA remains undetectable.  We are now 3 years out from the patient's surgery.  We will plan on continuing to check PSAs every 6 months for the next year and then we will go to annual testing.  The patient will give us a call to go over his PSA results in 6 months and we will see the patient back physically in a year from now with another PSA.  After that, the patient will be able to follow with his primary doctor for annual PSA checks if he is so inclined or of course, he can always continue to follow with us, but we will give the patient the option a year 4.         MAL CASE MD             D: 2018   T: 2018   MT: DEYANIRA      Name:     MICHAEL CHAIDEZ   MRN:      [id number]        Account:      [id number]   :      1940           Visit Date:   2018      Document: [id number]

## 2018-07-26 NOTE — NURSING NOTE
"Initial /86 (BP Location: Left arm, Patient Position: Chair, Cuff Size: Adult Regular)  Pulse 69  Resp 16 Estimated body mass index is 28.7 kg/(m^2) as calculated from the following:    Height as of 1/12/18: 1.778 m (5' 10\").    Weight as of 1/12/18: 90.7 kg (200 lb). .    Patient is here for a recheck for psa..   dmitriy sarkar LPN    "

## 2018-12-11 ENCOUNTER — OFFICE VISIT (OUTPATIENT)
Dept: FAMILY MEDICINE | Facility: CLINIC | Age: 78
End: 2018-12-11
Payer: COMMERCIAL

## 2018-12-11 VITALS
DIASTOLIC BLOOD PRESSURE: 78 MMHG | WEIGHT: 214 LBS | TEMPERATURE: 98.3 F | HEIGHT: 70 IN | RESPIRATION RATE: 18 BRPM | BODY MASS INDEX: 30.64 KG/M2 | SYSTOLIC BLOOD PRESSURE: 140 MMHG | OXYGEN SATURATION: 97 % | HEART RATE: 72 BPM

## 2018-12-11 DIAGNOSIS — M19.232 SECONDARY OSTEOARTHRITIS OF LEFT WRIST: Primary | ICD-10-CM

## 2018-12-11 PROCEDURE — 99213 OFFICE O/P EST LOW 20 MIN: CPT | Performed by: FAMILY MEDICINE

## 2018-12-11 ASSESSMENT — MIFFLIN-ST. JEOR: SCORE: 1706.95

## 2018-12-11 NOTE — PROGRESS NOTES
"  SUBJECTIVE:   Jaxson Chaidez is a 78 year old male who presents to clinic today for the following health issues:      Joint Pain/ left wrist and left leg numbness     Onset: wrist off and on x 2 years , left left x 1 1/2 weeks     Description:   Location: left wrist   Left leg   Character: Sharp and Stabbing    Intensity: moderate, severe    Progression of Symptoms: same    Accompanying Signs & Symptoms:  Other symptoms: numbness left leg     History:   Previous similar pain: YES      Precipitating factors:   Trauma or overuse: no     Alleviating factors:  Improved by: tennis ball on the leg     Therapies Tried and outcome: see above             Problem list and histories reviewed & adjusted, as indicated.  Additional history:         Reviewed and updated as needed this visit by clinical staff  Tobacco  Allergies  Med Hx  Surg Hx  Fam Hx  Soc Hx      Reviewed and updated as needed this visit by Provider      OBJECTIVE:  /78 (BP Location: Right arm, Patient Position: Chair, Cuff Size: Adult Large)   Pulse 72   Temp 98.3  F (36.8  C)   Resp 18   Ht 1.778 m (5' 10\")   Wt 97.1 kg (214 lb)   SpO2 97%   BMI 30.71 kg/m    LUNGS: clear to auscultation, normal breath sounds  CV: RRR without murmur  ABD: BS+, soft, nontender, no masses, no hepatosplenomegaly  EXT: He has some tenderness on the dorsal radial aspect of the wrist and pain with certain motion but no swelling.    ASSESSMENT:  Secondary osteoarthritis of left wrist    PLAN:  Orders Placed This Encounter     ORTHO  REFERRAL       "

## 2018-12-14 ENCOUNTER — OFFICE VISIT (OUTPATIENT)
Dept: ORTHOPEDICS | Facility: CLINIC | Age: 78
End: 2018-12-14
Payer: COMMERCIAL

## 2018-12-14 ENCOUNTER — ANCILLARY PROCEDURE (OUTPATIENT)
Dept: GENERAL RADIOLOGY | Facility: CLINIC | Age: 78
End: 2018-12-14
Attending: FAMILY MEDICINE
Payer: COMMERCIAL

## 2018-12-14 VITALS
SYSTOLIC BLOOD PRESSURE: 136 MMHG | DIASTOLIC BLOOD PRESSURE: 80 MMHG | WEIGHT: 214 LBS | HEIGHT: 70 IN | BODY MASS INDEX: 30.64 KG/M2

## 2018-12-14 DIAGNOSIS — M79.642 LEFT HAND PAIN: Primary | ICD-10-CM

## 2018-12-14 DIAGNOSIS — M19.032 PRIMARY OSTEOARTHRITIS OF LEFT WRIST: ICD-10-CM

## 2018-12-14 DIAGNOSIS — M79.642 LEFT HAND PAIN: ICD-10-CM

## 2018-12-14 PROCEDURE — 99203 OFFICE O/P NEW LOW 30 MIN: CPT | Mod: 25 | Performed by: FAMILY MEDICINE

## 2018-12-14 PROCEDURE — 73130 X-RAY EXAM OF HAND: CPT | Mod: LT

## 2018-12-14 PROCEDURE — 20606 DRAIN/INJ JOINT/BURSA W/US: CPT | Mod: LT | Performed by: FAMILY MEDICINE

## 2018-12-14 RX ORDER — ROPIVACAINE HYDROCHLORIDE 5 MG/ML
0.5 INJECTION, SOLUTION EPIDURAL; INFILTRATION; PERINEURAL
Status: DISCONTINUED | OUTPATIENT
Start: 2018-12-14 | End: 2023-12-06

## 2018-12-14 RX ORDER — BETAMETHASONE SODIUM PHOSPHATE AND BETAMETHASONE ACETATE 3; 3 MG/ML; MG/ML
6 INJECTION, SUSPENSION INTRA-ARTICULAR; INTRALESIONAL; INTRAMUSCULAR; SOFT TISSUE
Status: DISCONTINUED | OUTPATIENT
Start: 2018-12-14 | End: 2023-12-06

## 2018-12-14 RX ADMIN — BETAMETHASONE SODIUM PHOSPHATE AND BETAMETHASONE ACETATE 6 MG: 3; 3 INJECTION, SUSPENSION INTRA-ARTICULAR; INTRALESIONAL; INTRAMUSCULAR; SOFT TISSUE at 10:15

## 2018-12-14 RX ADMIN — ROPIVACAINE HYDROCHLORIDE 0.5 ML: 5 INJECTION, SOLUTION EPIDURAL; INFILTRATION; PERINEURAL at 10:15

## 2018-12-14 ASSESSMENT — MIFFLIN-ST. JEOR: SCORE: 1706.95
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
## 2018-12-14 NOTE — PATIENT INSTRUCTIONS
[facility] Injection Discharge Instructions      You may shower, however avoid swimming, tub baths or hot tubs for 24 hours following your procedure    You may have a mild to moderate increase in pain for several days following the injection.    It may take up to 14 days for the steroid medication to start working although you may feel the effect as early as a few days after the procedure.    You may use ice packs for 10-15 minutes, 3 to 4 times a day at the injection site for comfort    You may use anti-inflammatory medications (such as Ibuprofen or Aleve or Advil) or Tylenol for pain control if necessary    If you were fasting, you may resume your normal diet and medications after the procedure    If you have diabetes, check your blood sugar more frequently than usual as your blood sugar may be higher than normal for 10-14 days following a steroid injection. Contact your doctor who manages your diabetes if your blood sugar is higher than usual      If you experience any of the following, call [facility] @ [phone] or [phone]  -Fever over 100 degree F  -Swelling, bleeding, redness, drainage, warmth at the injection site  - New or worsening pain

## 2019-01-29 DIAGNOSIS — C61 MALIGNANT NEOPLASM OF PROSTATE (H): ICD-10-CM

## 2019-01-29 LAB — PSA SERPL-MCNC: <0.01 UG/L (ref 0–4)

## 2019-01-29 PROCEDURE — 36415 COLL VENOUS BLD VENIPUNCTURE: CPT | Performed by: UROLOGY

## 2019-01-29 PROCEDURE — 84153 ASSAY OF PSA TOTAL: CPT | Performed by: UROLOGY

## 2019-07-18 ENCOUNTER — DOCUMENTATION ONLY (OUTPATIENT)
Dept: UROLOGY | Facility: CLINIC | Age: 79
End: 2019-07-18

## 2019-07-18 NOTE — PROGRESS NOTES
This patient has a lab only appointment tomorrow Friday July 19,2019. He states that he needs his PSA checked. There is no order. Please place order if needed. Thank you.

## 2019-07-19 ENCOUNTER — DOCUMENTATION ONLY (OUTPATIENT)
Dept: UROLOGY | Facility: CLINIC | Age: 79
End: 2019-07-19

## 2019-07-19 DIAGNOSIS — C61 PROSTATE CANCER (H): Primary | ICD-10-CM

## 2019-07-19 DIAGNOSIS — C61 PROSTATE CANCER (H): ICD-10-CM

## 2019-07-19 PROCEDURE — 84153 ASSAY OF PSA TOTAL: CPT | Performed by: UROLOGY

## 2019-07-19 PROCEDURE — 36415 COLL VENOUS BLD VENIPUNCTURE: CPT | Performed by: UROLOGY

## 2019-07-19 NOTE — PROGRESS NOTES
This patient had a lab only appointment today July 19, 2019. He states that he needs his PSA checked and that he has done every 6 months. There is no order. Please place order if needed. The patient has been drawn for this.Thank you.

## 2019-07-24 LAB — PSA SERPL-MCNC: <0.01 UG/L (ref 0–4)

## 2019-07-25 ENCOUNTER — OFFICE VISIT (OUTPATIENT)
Dept: UROLOGY | Facility: CLINIC | Age: 79
End: 2019-07-25
Payer: MEDICARE

## 2019-07-25 VITALS — HEART RATE: 74 BPM | SYSTOLIC BLOOD PRESSURE: 154 MMHG | RESPIRATION RATE: 16 BRPM | DIASTOLIC BLOOD PRESSURE: 86 MMHG

## 2019-07-25 DIAGNOSIS — C61 PROSTATE CANCER (H): Primary | ICD-10-CM

## 2019-07-25 PROCEDURE — 99213 OFFICE O/P EST LOW 20 MIN: CPT | Performed by: UROLOGY

## 2019-07-25 NOTE — PROGRESS NOTES
Visit Date:   2019      REASON FOR VISIT TODAY:  Prostate cancer.      BRIEF COURSE:  Mr. Chaidez is a 79-year-old gentleman followed in our clinic for history of prostate cancer.  The patient underwent a robotic-assisted laparoscopic radical prostatectomy on 2015.  Final pathology demonstrated Amo 3+4 equals 7, pT3a N0 MX disease with negative margins.  The patient's PSAs have been undetectable to date.  The patient notes that he continues to do well.  He does point out that he continues to have an upper abdominal wall hernia that was present prior to his surgery, but it does not bother him.  It has been present for at least 5 years.      PHYSICAL EXAMINATION:   VITAL:  On exam, his blood pressure is 154/86, pulse 74.   GENERAL:  He is in no acute distress.      PSA from 2019 is undetectable.      ASSESSMENT AND PLAN:  Over half of today's 15-minute visit was spent counseling the patient regarding his prostate cancer.  I suggested to Mr. Chaidez that we were pleased to see his PSA continues to remain undetectable.  The patient is now 4 years out from his original surgery and we can go to annual PSA testing.  The patient is in agreement with the plan.  We will see him back in 1 year with a PSA.         MLA CASE MD             D: 2019   T: 2019   MT: ROMY      Name:     MICHAEL CHAIDEZ   MRN:      [id number]        Account:      [id number]   :      1940           Visit Date:   2019      Document: [id number]

## 2019-07-25 NOTE — NURSING NOTE
"Initial /86 (BP Location: Left arm, Patient Position: Chair, Cuff Size: Adult Regular)   Pulse 74   Resp 16  Estimated body mass index is 30.71 kg/m  as calculated from the following:    Height as of 12/14/18: 1.778 m (5' 10\").    Weight as of 12/14/18: 97.1 kg (214 lb). .    Patient is here for a recheck.  dmitriy sarkar LPN    "

## 2019-11-03 ENCOUNTER — HEALTH MAINTENANCE LETTER (OUTPATIENT)
Age: 79
End: 2019-11-03

## 2019-12-18 ENCOUNTER — OFFICE VISIT (OUTPATIENT)
Dept: ORTHOPEDICS | Facility: CLINIC | Age: 79
End: 2019-12-18
Payer: MEDICARE

## 2019-12-18 ENCOUNTER — ANCILLARY PROCEDURE (OUTPATIENT)
Dept: GENERAL RADIOLOGY | Facility: CLINIC | Age: 79
End: 2019-12-18
Attending: PEDIATRICS
Payer: MEDICARE

## 2019-12-18 VITALS
HEIGHT: 70 IN | BODY MASS INDEX: 30.49 KG/M2 | SYSTOLIC BLOOD PRESSURE: 137 MMHG | WEIGHT: 213 LBS | DIASTOLIC BLOOD PRESSURE: 66 MMHG

## 2019-12-18 DIAGNOSIS — M25.511 RIGHT SHOULDER PAIN: ICD-10-CM

## 2019-12-18 DIAGNOSIS — M79.641 RIGHT HAND PAIN: ICD-10-CM

## 2019-12-18 DIAGNOSIS — M19.011 PRIMARY OSTEOARTHRITIS OF RIGHT SHOULDER: Primary | ICD-10-CM

## 2019-12-18 PROCEDURE — 73030 X-RAY EXAM OF SHOULDER: CPT | Mod: RT

## 2019-12-18 PROCEDURE — 99204 OFFICE O/P NEW MOD 45 MIN: CPT | Performed by: PEDIATRICS

## 2019-12-18 ASSESSMENT — MIFFLIN-ST. JEOR: SCORE: 1697.41
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
## 2019-12-18 NOTE — PATIENT INSTRUCTIONS
Plan:  - Today's Plan of Care:  EMG of right arm  Rehab: Physical Therapy: [facility] - [phone]    -We also discussed other future treatment options:  US right shoulder, corticosteroid injection right shoulder    Follow Up: 3-4 weeks with me, after MRI  Follow up with primary care    If you have any further questions for your physician or physician s care team you can call [phone] and use option 3 to leave a voice message. Calls received during business hours will be returned same day.

## 2019-12-18 NOTE — RESULT ENCOUNTER NOTE
These results were discussed during office visit.    Neetu Lucas MD, CAQ  Primary Care Sports Medicine  [facility]
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
## 2020-01-22 ENCOUNTER — TRANSFERRED RECORDS (OUTPATIENT)
Dept: HEALTH INFORMATION MANAGEMENT | Facility: CLINIC | Age: 80
End: 2020-01-22

## 2020-01-24 ENCOUNTER — OFFICE VISIT (OUTPATIENT)
Dept: ORTHOPEDICS | Facility: CLINIC | Age: 80
End: 2020-01-24
Payer: MEDICARE

## 2020-01-24 VITALS
WEIGHT: 213 LBS | DIASTOLIC BLOOD PRESSURE: 76 MMHG | BODY MASS INDEX: 30.49 KG/M2 | SYSTOLIC BLOOD PRESSURE: 130 MMHG | HEIGHT: 70 IN

## 2020-01-24 DIAGNOSIS — M79.644 FINGER PAIN, RIGHT: Primary | ICD-10-CM

## 2020-01-24 DIAGNOSIS — M54.12 CERVICAL RADICULOPATHY: ICD-10-CM

## 2020-01-24 PROCEDURE — 99214 OFFICE O/P EST MOD 30 MIN: CPT | Performed by: PEDIATRICS

## 2020-01-24 ASSESSMENT — MIFFLIN-ST. JEOR: SCORE: 1697.41

## 2020-01-24 NOTE — PROGRESS NOTES
Sports Medicine Clinic Visit - Interim History January 24, 2020    PCP: Michael Harvey    Jaxson Chaidez is a 79 year old male who is seen in f/u up for    Finger pain, right  Cervical radiculopathy. Since last visit on 12/18/19 patient has completed a EMG of the right upper extremity. He reports he has seen an improvement of the right shoulder pain with massage therapy. He has no been to physical therapy. He reports numbness and tingling in the right index finger persists along with coldness.    Symptoms are better with: massage  Symptoms are worse with: nothing  Additional Features:   Positive: numbness and weakness in right index finger   Negative: swelling, bruising, popping, grinding, catching, locking, instability and paresthesias    Social History: retired    Review of Systems  Skin: no bruising, no swelling  Musculoskeletal: as above  Neurologic: yes numbness, paresthesias  Remainder of review of systems is negative including constitutional, CV, pulmonary, GI, except as noted in HPI or medical history.    Patient's current problem list, past medical and surgical history, and family history were reviewed.    Patient Active Problem List   Diagnosis     Hearing loss     Cervicalgia     CARDIOVASCULAR SCREENING; LDL GOAL LESS THAN 160     Advanced directives, counseling/discussion     Malignant neoplasm of prostate (H)     Status post total left knee replacement     Left knee DJD     Senile nuclear sclerosis     Prostate cancer (H)     Past Medical History:   Diagnosis Date     Arthritis      Prostate cancer (H)      Past Surgical History:   Procedure Laterality Date     ARTHROPLASTY KNEE Left 2/25/2015    Procedure: ARTHROPLASTY KNEE;  Surgeon: Emery Lincoln MD;  Location: [facility]     BIOPSY      Prostate     COLONOSCOPY       DAVINCI LYMPHADENECTOMY Bilateral 7/24/2015    Procedure: DAVINCI LYMPHADENECTOMY;  Surgeon: Rk Stephenson MD;  Location: [facility]     DAVINCI PROSTATECTOMY N/A  "7/24/2015    Procedure: DAVINCI PROSTATECTOMY;  Surgeon: Rk Stephenson MD;  Location:  OR     ENT SURGERY Right 2007    cochlear implant     INJECT EPIDURAL LUMBAR  3/1/2011    INJECT EPIDURAL LUMBAR performed by GENERIC ANESTHESIA PROVIDER at [facility]     PHACOEMULSIFICATION WITH STANDARD INTRAOCULAR LENS IMPLANT Left 5/21/2015    Procedure: PHACOEMULSIFICATION WITH STANDARD INTRAOCULAR LENS IMPLANT;  Surgeon: Romero Funk MD;  Location: [facility]     SURGICAL HISTORY OF -   01/21/2002    Colonoscopy     Family History   Problem Relation Age of Onset     Alzheimer Disease Mother      Diabetes Father      Diabetes Brother      Prostate Cancer Brother 75     Prostate Cancer Brother        Objective  /76   Ht 1.778 m (5' 10\")   Wt 96.6 kg (213 lb)   BMI 30.56 kg/m      GENERAL APPEARANCE: healthy, alert and no distress   GAIT: NORMAL  SKIN: no suspicious lesions or rashes  HEENT: Sclera clear, anicteric  CV: good peripheral pulses  RESP: Breathing not labored  NEURO: Normal strength and tone, mentation intact and speech normal  PSYCH:  mentation appears normal and affect normal/bright    Cervical Spine Exam  Inspection:       No visible deformity        normal lordotic curvature maintained     Posture:      normal     Tender:      none     Non-Tender:      remainder of cervical spine area     Range of Motion:       Limited extension     Painful Motions:      none     Strength:     C4 (shoulder shrug)  symmetric 5/5       C5 (shoulder abduction) symmetric 5/5       C6 (elbow flexion) symmetric 5/5       C7 (elbow extension) symmetric 5/5       C8 (finger abduction, thumb flexion) symmetric 5/5     Sensation:     grossly intact througout bilateral upper extremities     Special Tests:      neg (-) Spurling     Skin:     well perfused       capillary refill brisk     Lymphatics:      no edema noted in the upper extremities      Right Index finger with mild joint swelling, decreased " flexion    Radiology/EMG  I ordered, visualized and reviewed these images with the patient  EMG of right upper extremity on 1/22/20 with [facility]  Conclusion: Chronic right C7/C8 radiculopathies    Assessment:  1. Finger pain, right    2. Cervical radiculopathy      Finger pain possibly related to cervical radiculopathy, given chronic findings on EMG, will refer to spine.  Recommend follow up with primary care as well to evaluate other possible etiologies.    Plan:  - Today's Plan of Care:  Referral to a Spine at [facility]  Follow up with Primary Care    Follow Up: as needed    Concerning signs and symptoms were reviewed.  The patient expressed understanding of this management plan and all questions were answered at this time.    Neetu Lucas MD CA  Primary Care Sports Medicine  [facility]

## 2020-01-24 NOTE — PATIENT INSTRUCTIONS
Plan:  - Today's Plan of Care:  Referral to a Spine at [facility]  Follow up with Primary Care    Follow Up: as needed    If you have any further questions for your physician or physician s care team you can call [phone] and use option 3 to leave a voice message. Calls received during business hours will be returned same day.

## 2020-01-24 NOTE — LETTER
1/24/2020         RE: Jaxson Chaidez  [address] [place] [address]        Dear Colleague,    Thank you for referring your patient, Jaxson Chaidez, to the [facility]. Please see a copy of my visit note below.    Sports Medicine Clinic Visit - Interim History January 24, 2020    PCP: Michael Harvey    Jaxson Chaidez is a 79 year old male who is seen in f/u up for    Finger pain, right  Cervical radiculopathy. Since last visit on 12/18/19 patient has completed a EMG of the right upper extremity. He reports he has seen an improvement of the right shoulder pain with massage therapy. He has no been to physical therapy. He reports numbness and tingling in the right index finger persists along with coldness.    Symptoms are better with: massage  Symptoms are worse with: nothing  Additional Features:   Positive: numbness and weakness in right index finger   Negative: swelling, bruising, popping, grinding, catching, locking, instability and paresthesias    Social History: retired    Review of Systems  Skin: no bruising, no swelling  Musculoskeletal: as above  Neurologic: yes numbness, paresthesias  Remainder of review of systems is negative including constitutional, CV, pulmonary, GI, except as noted in HPI or medical history.    Patient's current problem list, past medical and surgical history, and family history were reviewed.    Patient Active Problem List   Diagnosis     Hearing loss     Cervicalgia     CARDIOVASCULAR SCREENING; LDL GOAL LESS THAN 160     Advanced directives, counseling/discussion     Malignant neoplasm of prostate (H)     Status post total left knee replacement     Left knee DJD     Senile nuclear sclerosis     Prostate cancer (H)     Past Medical History:   Diagnosis Date     Arthritis      Prostate cancer (H)      Past Surgical History:   Procedure Laterality Date     ARTHROPLASTY KNEE Left 2/25/2015    Procedure: ARTHROPLASTY KNEE;   "Surgeon: Emery Lincoln MD;  Location: [facility]     BIOPSY      Prostate     COLONOSCOPY       DAVINCI LYMPHADENECTOMY Bilateral 7/24/2015    Procedure: DAVINCI LYMPHADENECTOMY;  Surgeon: Rk Stephenson MD;  Location:  OR     DAVINCI PROSTATECTOMY N/A 7/24/2015    Procedure: DAVINCI PROSTATECTOMY;  Surgeon: Rk Stephenson MD;  Location: [facility]     ENT SURGERY Right 2007    cochlear implant     INJECT EPIDURAL LUMBAR  3/1/2011    INJECT EPIDURAL LUMBAR performed by GENERIC ANESTHESIA PROVIDER at [facility]     PHACOEMULSIFICATION WITH STANDARD INTRAOCULAR LENS IMPLANT Left 5/21/2015    Procedure: PHACOEMULSIFICATION WITH STANDARD INTRAOCULAR LENS IMPLANT;  Surgeon: Romero Funk MD;  Location: [facility]     SURGICAL HISTORY OF -   01/21/2002    Colonoscopy     Family History   Problem Relation Age of Onset     Alzheimer Disease Mother      Diabetes Father      Diabetes Brother      Prostate Cancer Brother 75     Prostate Cancer Brother        Objective  /76   Ht 1.778 m (5' 10\")   Wt 96.6 kg (213 lb)   BMI 30.56 kg/m       GENERAL APPEARANCE: healthy, alert and no distress   GAIT: NORMAL  SKIN: no suspicious lesions or rashes  HEENT: Sclera clear, anicteric  CV: good peripheral pulses  RESP: Breathing not labored  NEURO: Normal strength and tone, mentation intact and speech normal  PSYCH:  mentation appears normal and affect normal/bright    Cervical Spine Exam  Inspection:       No visible deformity        normal lordotic curvature maintained     Posture:      normal     Tender:      none     Non-Tender:      remainder of cervical spine area     Range of Motion:       Limited extension     Painful Motions:      none     Strength:     C4 (shoulder shrug)  symmetric 5/5       C5 (shoulder abduction) symmetric 5/5       C6 (elbow flexion) symmetric 5/5       C7 (elbow extension) symmetric 5/5       C8 (finger abduction, thumb flexion) symmetric 5/5     Sensation:     grossly intact " througout bilateral upper extremities     Special Tests:      neg (-) Spurling     Skin:     well perfused       capillary refill brisk     Lymphatics:      no edema noted in the upper extremities      Right Index finger with mild joint swelling, decreased flexion    Radiology/EMG  I ordered, visualized and reviewed these images with the patient  EMG of right upper extremity on 1/22/20 with [facility]  Conclusion: Chronic right C7/C8 radiculopathies    Assessment:  1. Finger pain, right    2. Cervical radiculopathy      Finger pain possibly related to cervical radiculopathy, given chronic findings on EMG, will refer to spine.  Recommend follow up with primary care as well to evaluate other possible etiologies.    Plan:  - Today's Plan of Care:  Referral to a Spine at [facility]  Follow up with Primary Care    Follow Up: as needed    Concerning signs and symptoms were reviewed.  The patient expressed understanding of this management plan and all questions were answered at this time.    Neetu uLcas MD CAQ  Primary Care Sports Medicine  [facility]      Again, thank you for allowing me to participate in the care of your patient.        Sincerely,        Neetu Lucas MD

## 2020-02-10 ENCOUNTER — HEALTH MAINTENANCE LETTER (OUTPATIENT)
Age: 80
End: 2020-02-10

## 2020-06-29 ENCOUNTER — TRANSFERRED RECORDS (OUTPATIENT)
Dept: HEALTH INFORMATION MANAGEMENT | Facility: CLINIC | Age: 80
End: 2020-06-29

## 2020-07-15 ENCOUNTER — TRANSFERRED RECORDS (OUTPATIENT)
Dept: HEALTH INFORMATION MANAGEMENT | Facility: CLINIC | Age: 80
End: 2020-07-15

## 2020-07-20 ENCOUNTER — TRANSFERRED RECORDS (OUTPATIENT)
Dept: HEALTH INFORMATION MANAGEMENT | Facility: CLINIC | Age: 80
End: 2020-07-20

## 2020-07-27 DIAGNOSIS — C61 PROSTATE CANCER (H): ICD-10-CM

## 2020-07-27 LAB — PSA SERPL-MCNC: <0.01 UG/L (ref 0–4)

## 2020-07-27 PROCEDURE — 84153 ASSAY OF PSA TOTAL: CPT | Performed by: UROLOGY

## 2020-07-27 PROCEDURE — 36415 COLL VENOUS BLD VENIPUNCTURE: CPT | Performed by: UROLOGY

## 2020-07-30 ENCOUNTER — VIRTUAL VISIT (OUTPATIENT)
Dept: UROLOGY | Facility: CLINIC | Age: 80
End: 2020-07-30
Payer: COMMERCIAL

## 2020-07-30 DIAGNOSIS — C61 PROSTATE CANCER (H): Primary | ICD-10-CM

## 2020-07-30 PROCEDURE — 99212 OFFICE O/P EST SF 10 MIN: CPT | Mod: 95 | Performed by: UROLOGY

## 2020-07-30 NOTE — PROGRESS NOTES
REASON FOR VISIT TODAY:  Prostate cancer.      BRIEF COURSE:  Mr. Chaidez is a 80-year-old gentleman followed in our clinic for history of prostate cancer.  The patient underwent a robotic-assisted laparoscopic radical prostatectomy on 07/24/2015.  Final pathology demonstrated Holbrook 3+4 equals 7, pT3a N0 MX disease with negative margins.  The patient's PSAs have been undetectable to date.  The patient notes that he continues to do well.  He does point out that he continues to have an upper abdominal wall hernia that was present prior to his surgery, but it does not bother him.  It has been present for at least 5 years.      OBJECTIVE:     PSA from 07/27/2020 is undetectable.      ASSESSMENT AND PLAN:  Over half of today's 5-minute visit was spent counseling the patient regarding his prostate cancer.  I suggested to Mr. Chaidez that we were pleased to see his PSA continues to remain undetectable.  The patient is now 4 years out from his original surgery and we can go to annual PSA testing.  The patient is in agreement with the plan.  We will see him back in 1 year with a PSA.

## 2020-10-19 ENCOUNTER — TRANSFERRED RECORDS (OUTPATIENT)
Dept: HEALTH INFORMATION MANAGEMENT | Facility: CLINIC | Age: 80
End: 2020-10-19

## 2020-11-16 ENCOUNTER — HEALTH MAINTENANCE LETTER (OUTPATIENT)
Age: 80
End: 2020-11-16

## 2021-01-05 ENCOUNTER — OFFICE VISIT (OUTPATIENT)
Dept: FAMILY MEDICINE | Facility: CLINIC | Age: 81
End: 2021-01-05
Payer: COMMERCIAL

## 2021-01-05 VITALS
OXYGEN SATURATION: 95 % | HEIGHT: 70 IN | WEIGHT: 213.8 LBS | DIASTOLIC BLOOD PRESSURE: 80 MMHG | HEART RATE: 70 BPM | RESPIRATION RATE: 16 BRPM | TEMPERATURE: 97.8 F | BODY MASS INDEX: 30.61 KG/M2 | SYSTOLIC BLOOD PRESSURE: 130 MMHG

## 2021-01-05 DIAGNOSIS — Z01.818 PREOP GENERAL PHYSICAL EXAM: Primary | ICD-10-CM

## 2021-01-05 PROCEDURE — 99214 OFFICE O/P EST MOD 30 MIN: CPT | Performed by: FAMILY MEDICINE

## 2021-01-05 ASSESSMENT — MIFFLIN-ST. JEOR: SCORE: 1696.04

## 2021-01-05 NOTE — PROGRESS NOTES
[facility]  [address]  [facility] [address]  Phone: [phone]  Primary Provider: Michael Harvey  Pre-op Performing Provider: MALINDA ZALDIVAR    PREOPERATIVE EVALUATION:  Today's date: 1/5/2021    Jaxson Chaidez is a 80 year old male who presents for a preoperative evaluation.    Surgical Information:  Surgery/Procedure: Rotator cuff repair of right shoulder  Surgery Location: [facility]  Surgeon: Dr Axel Cordova  Surgery Date: 1/13/21  Time of Surgery: unknown  Where patient plans to recover: At home with family  Fax number for surgical facility: [phone]    Type of Anesthesia Anticipated: General/block    Subjective     HPI related to upcoming procedure: Patient has chronic right shoulder pain due to rotator cuff derangement.. Hence, planned surgery. Reviewed above with patient.    Preop Questions 1/2/2021   1. Have you ever had a heart attack or stroke? No   2. Have you ever had surgery on your heart or blood vessels, such as a stent placement, a coronary artery bypass, or surgery on an artery in your head, neck, heart, or legs? No   3. Do you have chest pain with activity? No   4. Do you have a history of  heart failure? No   5. Do you currently have a cold, bronchitis or symptoms of other infection? No   6. Do you have a cough, shortness of breath, or wheezing? No   7. Do you or anyone in your family have previous history of blood clots? No   8. Do you or does anyone in your family have a serious bleeding problem such as prolonged bleeding following surgeries or cuts? No   9. Have you ever had problems with anemia or been told to take iron pills? YES - Takes on occasion for the purpose of donating blood.   10. Have you had any abnormal blood loss such as black, tarry or bloody stools? No   11. Have you ever had a blood transfusion? No   12. Are you willing to have a blood transfusion if it is medically needed before, during, or  after your surgery? Yes   13. Have you or any of your relatives ever had problems with anesthesia? No   14. Do you have sleep apnea, excessive snoring or daytime drowsiness? No   15. Do you have any artifical heart valves or other implanted medical devices like a pacemaker, defibrillator, or continuous glucose monitor? No   16. Do you have artificial joints? YES - Left knee replacement   17. Are you allergic to latex? No       Health Care Directive:  Patient does not have a Health Care Directive or Living Will: Advance Directive received and scanned. Click on Code in the patient header to view.    Preoperative Review of :   reviewed - no record of controlled substances prescribed.      Status of Chronic Conditions:  See problem list for active medical problems.  Problems all longstanding and stable, except as noted/documented.  See ROS for pertinent symptoms related to these conditions.      Review of Systems  CONSTITUTIONAL: NEGATIVE for fever, chills, change in weight  INTEGUMENTARY/SKIN: NEGATIVE for worrisome rashes, moles or lesions  EYES: NEGATIVE for vision changes or irritation  ENT/MOUTH: NEGATIVE for ear, mouth and throat problems  RESP: NEGATIVE for significant cough or SOB  CV: NEGATIVE for chest pain, palpitations or peripheral edema  GI: NEGATIVE for nausea, abdominal pain, heartburn, or change in bowel habits  : NEGATIVE for frequency, dysuria, or hematuria  MUSCULOSKELETAL: chronic right shoulder pain  NEURO: NEGATIVE for weakness, dizziness or paresthesias  ENDOCRINE: NEGATIVE for temperature intolerance, skin/hair changes  HEME: NEGATIVE for bleeding problems  PSYCHIATRIC: NEGATIVE for changes in mood or affect    Patient Active Problem List    Diagnosis Date Noted     Prostate cancer (H) 07/24/2015     Priority: Medium     Senile nuclear sclerosis 05/19/2015     Priority: Medium     Status post total left knee replacement 02/25/2015     Priority: Medium     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  Document not previously scanned.  Validation form completed and sent with document to be scanned.  Code Status needs to be updated to reflect choices in most recent ACP document. Orders placed.  Confirmed/documented designated decision maker(s). See permanent comments section of demographics in clinical tab. View document(s) and details by clicking on code status.   Added by Katie Torrez on 5/10/2013.             CARDIOVASCULAR SCREENING; LDL GOAL LESS THAN 160 10/31/2010     Priority: Medium     Cervicalgia 05/15/2006     Priority: Medium     Hearing loss      Priority: Medium     Problem list name updated by automated process. Provider to review        Past Medical History:   Diagnosis Date     Arthritis      Prostate cancer (H)      Past Surgical History:   Procedure Laterality Date     ARTHROPLASTY KNEE Left 2/25/2015    Procedure: ARTHROPLASTY KNEE;  Surgeon: Emery Lincoln MD;  Location: [facility]     BIOPSY      Prostate     COLONOSCOPY       DAVINCI LYMPHADENECTOMY Bilateral 7/24/2015    Procedure: DAVINCI LYMPHADENECTOMY;  Surgeon: Rk Stephenson MD;  Location:  OR     DAVINCI PROSTATECTOMY N/A 7/24/2015    Procedure: DAVINCI PROSTATECTOMY;  Surgeon: Rk Stephenson MD;  Location:  OR     ENT SURGERY Right 2007    cochlear implant     GENITOURINARY SURGERY  2015    Prostate removal     INJECT EPIDURAL LUMBAR  3/1/2011    INJECT EPIDURAL LUMBAR performed by GENERIC ANESTHESIA PROVIDER at [facility]     PHACOEMULSIFICATION WITH STANDARD INTRAOCULAR LENS IMPLANT Left 5/21/2015    Procedure: PHACOEMULSIFICATION WITH STANDARD INTRAOCULAR LENS IMPLANT;  Surgeon: Romero Funk MD;  Location:  "[facility]     SURGICAL HISTORY OF -   01/21/2002    Colonoscopy     Current Outpatient Medications   Medication Sig Dispense Refill     aspirin 81 MG tablet Take 81 mg by mouth daily       Flaxseed, Linseed, (FLAX SEED OIL) 1000 MG capsule Take 1 capsule by mouth two times daily.       MULTIVITAL OR 1 tablet by mouth daily       order for DME Equipment being ordered: Left wrist quick fit         No Known Allergies     Social History     Tobacco Use     Smoking status: Never Smoker     Smokeless tobacco: Never Used   Substance Use Topics     Alcohol use: Not Currently     Comment: Rarely     Family History   Problem Relation Age of Onset     Alzheimer Disease Mother      Diabetes Father      Diabetes Brother      Prostate Cancer Brother 75     Colon Cancer Brother      Prostate Cancer Brother      Hypertension Brother      History   Drug Use No         Objective     /80   Pulse 70   Temp 97.8  F (36.6  C) (Tympanic)   Resp 16   Ht 1.778 m (5' 10\")   Wt 97 kg (213 lb 12.8 oz)   SpO2 95%   BMI 30.68 kg/m      Physical Exam  GENERAL APPEARANCE:  alert and no distress; ambulatory w/o assist  EYES: pink conj, no icterus, PERRL, EOMI  HENT: ear canals and TM's normal, nose and mouth without ulcers or lesions, oropharynx clear and oral mucous membranes moist  NECK: no adenopathy, no asymmetry, masses, or scars and thyroid normal to palpation  RESP: lungs clear to auscultation - no rales, rhonchi or wheezes  CV: regular rates and rhythm, normal S1 S2, no S3 or S4, no murmur, click or rub, no peripheral edema and peripheral pulses strong  ABDOMEN: soft, nontender, no hepatosplenomegaly, no masses and bowel sounds normal  MS: no musculoskeletal defects are noted and gait is age appropriate without ataxia  SKIN: good turgor, no rash/jaundice/ecchymosis  NEURO: Normal strength and tone, sensory exam grossly normal, mentation intact and speech normal    No results for input(s): HGB, PLT, INR, NA, POTASSIUM, CR, A1C in " the last 77182 hours.     Diagnostics:  No labs were ordered during this visit.   No EKG required, no history of coronary heart disease, significant arrhythmia, peripheral arterial disease or other structural heart disease.    Revised Cardiac Risk Index (RCRI):  The patient has the following serious cardiovascular risks for perioperative complications:   - No serious cardiac risks = 0 points     RCRI Interpretation: 0 points: Class I (very low risk - 0.4% complication rate)           Assessment & Plan   The proposed surgical procedure is considered INTERMEDIATE risk.    Jaxson was seen today for pre-op exam.    Diagnoses and all orders for this visit:    Preop general physical exam           Risks and Recommendations:  The patient has the following additional risks and recommendations for perioperative complications:   - No identified additional risk factors other than previously addressed    Medication Instructions:  Patient is on no chronic medications    RECOMMENDATION:  APPROVAL GIVEN to proceed with proposed procedure, without further diagnostic evaluation.    Signed Electronically by: Luis Samuel MD    Copy of this evaluation report is provided to requesting physician.    [facility] Pre[facility]    Revised Cardiac Risk Index

## 2021-01-05 NOTE — PATIENT INSTRUCTIONS
"Based on your medical history and current preoperative guidelines, there is no test that is required before surgery.    Hold any oral medications you take on morning of surgery.    Be sure to contact your surgery team today to ask for an order for your Covid-19 test.    Preparing for Your Surgery  Getting started  A surgery nurse will call you to review your health history and instructions. They will give you an arrival time based on your scheduled surgery time.  Please be ready to share the following:    Your doctor's clinic name and phone number    Your medical, surgical and anesthesia history    A list of allergies and sensitivities    A list of medicines, including herbal treatments and over-the-counter drugs    Whether the patient has a legal guardian (ask how to send us the papers in advance)  If your child is having surgery, please ask for a copy of Preparing for Your Child's Surgery.    Preparing for surgery    Within 30 days of surgery: Have an exam at your family clinic (primary care clinic), or go to a pre-operative clinic. This exam is called a \"History and Physical,\" or H&P.    At your H&P exam, talk to your care team about all medicines you take. If you need to stop any medicines before surgery, ask when to start taking them again.  ? We do this for your safety. Many medicines can make you bleed too much during surgery. Some change how well surgery (anesthesia) drugs work.    Call your insurance company to see what it will and won't pay for. Ask if they need to pre-approve the surgery. (If no insurance, call [phone].)    Call your surgeon's clinic if there's any change in your health. This includes signs of a cold or flu (sore throat, runny nose, cough, rash, fever). It also includes a scrape or scratch near the surgery site.    If you have questions on the day of surgery, call your surgery center.  Eating and drinking guidelines  For your safety: Unless your surgeon tells you otherwise, follow " the guidelines below.    Eat and drink as usual until 8 hours before surgery. After that, no food or milk.    Drink clear liquids until 2 hours before surgery. These are liquids you can see through, like water, Gatorade and Propel Water. You may also have black coffee and tea (no cream or milk).    Nothing by mouth within 2 hours of surgery. This includes gum, candy and breath mints.    Stop alcohol the midnight before surgery.    If your family clinic tells you to take medicine on the morning of surgery, it's okay to take it with a sip of water.  Preventing infection    Shower or bathe the night before and morning of your surgery. Follow the instructions your clinic gave you. (If no instructions, use regular soap.)    Don't shave or clip hair near your surgery site. This can lead to skin infection.    Don't smoke the morning of surgery. Smoking increases the risk of infection. You may chew nicotine gum up to 2 hours before surgery. A nicotine patch is okay.  ? Note: Some surgeries require you to completely quit smoking and nicotine. Check with your surgeon.    Your care team will make every effort to keep you safe from infection. We will:  ? Clean our hands often with soap and water (or an alcohol-based hand rub).  ? Clean the skin at your surgery site with a special soap that kills germs. We'll also remove hair from the site as needed.  ? Wear special hair covers, masks, gowns and gloves during surgery.  ? Give antibiotic medicine, if prescribed. Not all surgeries need antibiotics.  What to bring on the day of surgery    Photo ID and insurance card    Copy of your health care directive, if you have one    Glasses and hearing aides (bring cases)  ? You can't wear contacts during surgery    Inhaler and eye drops, if you use them (tell us about these when you arrive)    CPAP machine or breathing device, if you use them    A few personal items, if spending the night    If you have . . .  ? A pacemaker or ICD (cardiac  defibrillator): Bring the ID card.  ? An implanted stimulator: Bring the remote control.  ? A legal guardian: Bring a copy of the certified (court-stamped) guardianship papers.  Please remove any jewelry, including body piercings. Leave jewelry and other valuables at home.  If you're going home the day of surgery  Important: If you don't follow the rules below, we must cancel your surgery.     Arrange for someone to drive you home after surgery. You may not drive, take a taxi or take public transportation by yourself (unless you'll have local anesthesia only).    Arrange for a responsible adult to stay with you overnight. If you don't, we may keep you in the hospital overnight, and you may need to pay the costs yourself.  Questions?   If you have any questions for your care team, list them here: _________________________________________________________________________________________________________________________________________________________________________________________________________________________________________________________________________________________________________________________  For informational purposes only. Not to replace the advice of your health care provider. Copyright   8423-3723 [facility]. All rights reserved. Clinically reviewed by Danielle Beauchamp MD. [employer] [id number] - REV 07/19.

## 2021-01-07 ENCOUNTER — TELEPHONE (OUTPATIENT)
Dept: FAMILY MEDICINE | Facility: CLINIC | Age: 81
End: 2021-01-07

## 2021-01-07 NOTE — TELEPHONE ENCOUNTER
Pt /wife will call his Surgeons office for info on when and where he will have his COVID test done. Kpavelrjosh

## 2021-01-07 NOTE — TELEPHONE ENCOUNTER
Reason for Call:  Other     Detailed comments: Patients wife had called stating patient needs an order to do COVID test through [facility] she would like the order placed as soon as possible because he will need to get tested no later then Monday 01/11    Phone Number Patient can be reached at: Home number on file [phone] (home)    Best Time: Anytime    Can we leave a detailed message on this number? YES    Call taken on 1/7/2021 at 1:24 PM by Carmen Caicedo

## 2021-01-13 ENCOUNTER — TRANSFERRED RECORDS (OUTPATIENT)
Dept: HEALTH INFORMATION MANAGEMENT | Facility: CLINIC | Age: 81
End: 2021-01-13

## 2021-01-27 ENCOUNTER — TRANSFERRED RECORDS (OUTPATIENT)
Dept: HEALTH INFORMATION MANAGEMENT | Facility: CLINIC | Age: 81
End: 2021-01-27

## 2021-02-23 ENCOUNTER — IMMUNIZATION (OUTPATIENT)
Dept: FAMILY MEDICINE | Facility: CLINIC | Age: 81
End: 2021-02-23
Payer: COMMERCIAL

## 2021-02-23 PROCEDURE — 91300 PR COVID VAC PFIZER DIL RECON 30 MCG/0.3 ML IM: CPT

## 2021-02-23 PROCEDURE — 0001A PR COVID VAC PFIZER DIL RECON 30 MCG/0.3 ML IM: CPT

## 2021-03-10 ENCOUNTER — TRANSFERRED RECORDS (OUTPATIENT)
Dept: HEALTH INFORMATION MANAGEMENT | Facility: CLINIC | Age: 81
End: 2021-03-10

## 2021-03-16 ENCOUNTER — IMMUNIZATION (OUTPATIENT)
Dept: FAMILY MEDICINE | Facility: CLINIC | Age: 81
End: 2021-03-16
Attending: FAMILY MEDICINE
Payer: COMMERCIAL

## 2021-03-16 PROCEDURE — 0002A PR COVID VAC PFIZER DIL RECON 30 MCG/0.3 ML IM: CPT

## 2021-03-16 PROCEDURE — 91300 PR COVID VAC PFIZER DIL RECON 30 MCG/0.3 ML IM: CPT

## 2021-04-03 ENCOUNTER — HEALTH MAINTENANCE LETTER (OUTPATIENT)
Age: 81
End: 2021-04-03

## 2021-04-21 ENCOUNTER — TRANSFERRED RECORDS (OUTPATIENT)
Dept: HEALTH INFORMATION MANAGEMENT | Facility: CLINIC | Age: 81
End: 2021-04-21

## 2021-05-14 ENCOUNTER — TRANSFERRED RECORDS (OUTPATIENT)
Dept: HEALTH INFORMATION MANAGEMENT | Facility: CLINIC | Age: 81
End: 2021-05-14

## 2021-07-21 ENCOUNTER — TRANSFERRED RECORDS (OUTPATIENT)
Dept: HEALTH INFORMATION MANAGEMENT | Facility: CLINIC | Age: 81
End: 2021-07-21

## 2021-07-29 ENCOUNTER — LAB (OUTPATIENT)
Dept: LAB | Facility: CLINIC | Age: 81
End: 2021-07-29
Payer: COMMERCIAL

## 2021-07-29 DIAGNOSIS — C61 PROSTATE CANCER (H): ICD-10-CM

## 2021-07-29 LAB — PSA SERPL-MCNC: <0.01 UG/L (ref 0–4)

## 2021-07-29 PROCEDURE — 84153 ASSAY OF PSA TOTAL: CPT

## 2021-07-29 PROCEDURE — 36415 COLL VENOUS BLD VENIPUNCTURE: CPT

## 2021-07-29 NOTE — LETTER
August 30, 2021      Jaxson Hwang Dm  [address] [place] [address]        Dear ,    We are writing to inform you of your test results.    Your PSA remains undetectable.  PSA in 1 year with PA.      Resulted Orders   PSA tumor marker   Result Value Ref Range    PSA Tumor Marker <0.01 0.00 - 4.00 ug/L       If you have any questions or concerns, please call the clinic at the number listed above.       Sincerely,      Rk Stephenson MD

## 2021-09-12 ENCOUNTER — HEALTH MAINTENANCE LETTER (OUTPATIENT)
Age: 81
End: 2021-09-12

## 2021-10-07 NOTE — MR AVS SNAPSHOT
After Visit Summary   3/23/2017    Jaxson Chaidez    MRN: [id number]           Patient Information     Date Of Birth          1940        Visit Information        Provider Department      3/23/2017 8:40 AM LUCIANO Bal MD [facility]        Today's Diagnoses     Other constipation    -  1      Care Instructions    Take one capful of Miralax daily with 8 oz of fluid. Stop the Citrucel and stool softeners. This should help with the abdominal bloating and bowel issues. The sciatica pain should settle down also.        Follow-ups after your visit        Your next 10 appointments already scheduled     Jul 26, 2017  9:00 AM CDT   [facility]son [facility]   [facility]    [address] [address] [phone]              Who to contact     If you have questions or need follow up information about today's clinic visit or your schedule please contact [facility] directly at [phone].  Normal or non-critical lab and imaging results will be communicated to you by SoundCloudhart, letter or phone within 4 business days after the clinic has received the results. If you do not hear from us within 7 days, please contact the clinic through SoundCloudhart or phone. If you have a critical or abnormal lab result, we will notify you by phone as soon as possible.  Submit refill requests through [employer] or call your pharmacy and they will forward the refill request to us. Please allow 3 business days for your refill to be completed.          Additional Information About Your Visit        MyChart Information     [employer] gives you secure access to your electronic health record. If you see a primary care provider, you can also send messages to your care team and make appointments. If you have questions, please call your primary care clinic.  If you do not have a primary care provider, please call  "[phone] and they will assist you.        Care EveryWhere ID     This is your Care EveryWhere ID. This could be used by other organizations to access your [place] medical records  [id number]        Your Vitals Were     Pulse Height BMI (Body Mass Index)             60 5' 10\" (1.778 m) 31.6 kg/m2          Blood Pressure from Last 3 Encounters:   03/23/17 124/74   03/14/17 124/64   01/25/17 152/83    Weight from Last 3 Encounters:   03/23/17 220 lb 3.2 oz (99.9 kg)   03/14/17 224 lb 11.2 oz (101.9 kg)   08/30/16 219 lb (99.3 kg)              Today, you had the following     No orders found for display       Primary Care Provider Office Phone # Fax #    R Fredi Bal [phone]       [place] [address]        Thank you!     Thank you for choosing [facility] [place]  for your care. Our goal is always to provide you with excellent care. Hearing back from our patients is one way we can continue to improve our services. Please take a few minutes to complete the written survey that you may receive in the mail after your visit with us. Thank you!             Your Updated Medication List - Protect others around you: Learn how to safely use, store and throw away your medicines at www.disposemymeds.org.          This list is accurate as of: 3/23/17  9:09 AM.  Always use your most recent med list.                   Brand Name Dispense Instructions for use    ALEVE PO      Take 220 mg by mouth Reported on 3/14/2017       aspirin 81 MG tablet      Take 81 mg by mouth daily       flax seed oil 1000 MG capsule      Take 1 capsule by mouth two times daily.       MULTIVITAL PO      1 tablet by mouth daily         " [FreeTextEntry1] : moist heat to area\par decreased activity\par NSAID requesting covid swab

## 2022-01-07 ENCOUNTER — TRANSFERRED RECORDS (OUTPATIENT)
Dept: HEALTH INFORMATION MANAGEMENT | Facility: CLINIC | Age: 82
End: 2022-01-07
Payer: COMMERCIAL

## 2022-02-22 ENCOUNTER — ANCILLARY PROCEDURE (OUTPATIENT)
Dept: GENERAL RADIOLOGY | Facility: CLINIC | Age: 82
End: 2022-02-22
Attending: FAMILY MEDICINE
Payer: COMMERCIAL

## 2022-02-22 ENCOUNTER — OFFICE VISIT (OUTPATIENT)
Dept: FAMILY MEDICINE | Facility: CLINIC | Age: 82
End: 2022-02-22
Payer: COMMERCIAL

## 2022-02-22 VITALS
HEIGHT: 68 IN | DIASTOLIC BLOOD PRESSURE: 70 MMHG | RESPIRATION RATE: 16 BRPM | WEIGHT: 215.2 LBS | HEART RATE: 77 BPM | SYSTOLIC BLOOD PRESSURE: 136 MMHG | OXYGEN SATURATION: 97 % | TEMPERATURE: 97.5 F | BODY MASS INDEX: 32.61 KG/M2

## 2022-02-22 DIAGNOSIS — Z00.00 ENCOUNTER FOR MEDICARE ANNUAL WELLNESS EXAM: Primary | ICD-10-CM

## 2022-02-22 DIAGNOSIS — Z11.59 NEED FOR HEPATITIS C SCREENING TEST: ICD-10-CM

## 2022-02-22 DIAGNOSIS — R14.0 ABDOMINAL BLOATING: ICD-10-CM

## 2022-02-22 DIAGNOSIS — M54.6 CHRONIC MIDLINE THORACIC BACK PAIN: ICD-10-CM

## 2022-02-22 DIAGNOSIS — R79.89 ELEVATED SERUM CREATININE: ICD-10-CM

## 2022-02-22 DIAGNOSIS — G89.29 CHRONIC BILATERAL LOW BACK PAIN WITHOUT SCIATICA: ICD-10-CM

## 2022-02-22 DIAGNOSIS — C61 PROSTATE CANCER (H): ICD-10-CM

## 2022-02-22 DIAGNOSIS — R68.81 EARLY SATIETY: ICD-10-CM

## 2022-02-22 DIAGNOSIS — M54.50 CHRONIC BILATERAL LOW BACK PAIN WITHOUT SCIATICA: ICD-10-CM

## 2022-02-22 DIAGNOSIS — G89.29 CHRONIC MIDLINE THORACIC BACK PAIN: ICD-10-CM

## 2022-02-22 DIAGNOSIS — Z13.220 SCREENING FOR HYPERLIPIDEMIA: ICD-10-CM

## 2022-02-22 PROCEDURE — 72100 X-RAY EXAM L-S SPINE 2/3 VWS: CPT | Performed by: RADIOLOGY

## 2022-02-22 PROCEDURE — 99214 OFFICE O/P EST MOD 30 MIN: CPT | Mod: 25 | Performed by: FAMILY MEDICINE

## 2022-02-22 PROCEDURE — G0438 PPPS, INITIAL VISIT: HCPCS | Performed by: FAMILY MEDICINE

## 2022-02-22 PROCEDURE — 72070 X-RAY EXAM THORAC SPINE 2VWS: CPT | Performed by: RADIOLOGY

## 2022-02-22 ASSESSMENT — ENCOUNTER SYMPTOMS
HEMATURIA: 0
PARESTHESIAS: 0
JOINT SWELLING: 0
ARTHRALGIAS: 1
SHORTNESS OF BREATH: 0
CHILLS: 0
MYALGIAS: 1
HEADACHES: 1
WEAKNESS: 0
HEMATOCHEZIA: 0
NAUSEA: 0
DYSURIA: 0
PALPITATIONS: 0
EYE PAIN: 0
FREQUENCY: 0
DIZZINESS: 0
ABDOMINAL PAIN: 1
CONSTIPATION: 1
DIARRHEA: 0
NERVOUS/ANXIOUS: 0
HEARTBURN: 0
COUGH: 0
SORE THROAT: 0
FEVER: 0

## 2022-02-22 ASSESSMENT — PAIN SCALES - GENERAL: PAINLEVEL: MILD PAIN (2)

## 2022-02-22 ASSESSMENT — ACTIVITIES OF DAILY LIVING (ADL): CURRENT_FUNCTION: NO ASSISTANCE NEEDED

## 2022-02-22 NOTE — H&P (VIEW-ONLY)
"SUBJECTIVE:   Jaxson Chaidez is a 81 year old male who presents for Preventive Visit.      Patient has been advised of split billing requirements and indicates understanding: Yes  Are you in the first 12 months of your Medicare coverage?  No    Healthy Habits:     In general, how would you rate your overall health?  Good    Frequency of exercise:  2-3 days/week    Duration of exercise:  15-30 minutes    Do you usually eat at least 4 servings of fruit and vegetables a day, include whole grains    & fiber and avoid regularly eating high fat or \"junk\" foods?  Yes    Taking medications regularly:  Not Applicable    Medication side effects:  Not applicable    Ability to successfully perform activities of daily living:  No assistance needed    Home Safety:  Lack of grab bars in the bathroom    Hearing Impairment:  Difficulty following a conversation in a noisy restaurant or crowded room, feel that people are mumbling or not speaking clearly, need to ask people to speak up or repeat themselves, difficulty understanding soft or whispered speech and difficulty understanding speech on the telephone    In the past 6 months, have you been bothered by leaking of urine?  No    In general, how would you rate your overall mental or emotional health?  Good      PHQ-2 Total Score: 0    Additional concerns today:  Yes    [facility]    Do you feel safe in your environment? Yes    Have you ever done Advance Care Planning? (For example, a Health Directive, POLST, or a discussion with a medical provider or your loved ones about your wishes): Yes, advance care planning is on file.     Fall risk  Fallen 2 or more times in the past year?: No  Any fall with injury in the past year?: No    Cognitive Screening   1) Repeat 3 items (Leader, Season, Table)    2) Clock draw: NORMAL  3) 3 item recall: Recalls 1 object   Results: NORMAL clock, 1-2 items recalled: COGNITIVE IMPAIRMENT LESS " LIKELY    Mini-CogTM Copyright JUSTIN Aggarwal. Licensed by the author for use in [facility]; reprinted with permission (rich@[facility]). All rights reserved.      Do you have sleep apnea, excessive snoring or daytime drowsiness?: no    Reviewed and updated as needed this visit by clinical staff   Tobacco  Allergies  Meds   Med Hx  Surg Hx  Fam Hx  Soc Hx        Reviewed and updated as needed this visit by Provider   Tobacco               Social History     Tobacco Use     Smoking status: Never Smoker     Smokeless tobacco: Never Used   Substance Use Topics     Alcohol use: Not Currently     Comment: Rarely     If you drink alcohol do you typically have >3 drinks per day or >7 drinks per week? No    Alcohol Use 2/22/2022   Prescreen: >3 drinks/day or >7 drinks/week? No   Prescreen: >3 drinks/day or >7 drinks/week? -           Back Pain       Duration: off and on - restarted a month ago        Specific cause: none    Description:   Location of pain: low back left  Character of pain: dull ache and constant  Pain radiation:radiates into the right buttocks, radiates into the left buttocks and can wrap around the left groin  New numbness or weakness in legs, not attributed to pain:  no     Intensity: mild, moderate    History:   Pain interferes with job: Not applicable  History of back problems: no prior back problems  Any previous MRI or X-rays: None  Sees a specialist for back pain:  No  Therapies tried without relief: NSAIDs    Alleviating factors:   Improved by: none      Precipitating factors:  Worsened by: Nothing    Accompanying Signs & Symptoms:  Risk of Fracture:  Age >64  Risk of Cauda Equina:  None  Risk of Infection:  None  Risk of Cancer:  None  Risk of Ankylosing Spondylitis:  Onset at age <35, male, AND morning back stiffness. no     Reports when he is active he feels coldness and tightness on the chest when he lifts something.  Can also occur randomly together with nausea.  Between shoulder  "blades, feels \"tender\".  Been occurring for many years per patient.  This is not new. He was told that his \"back is all screwed up\" causing the chest discomfort above.      Current providers sharing in care for this patient include:   Patient Care Team:  Luis Samuel MD as PCP - General (Family Medicine)  kR Stephenson MD as MD (Urology)  Severo, Paris, RN as Nurse Coordinator (Urology)  LUCIANO Bal MD as Referring Physician (Family Practice)  Rk Estes MD as MD (Urology)  Luis Samuel MD as Assigned PCP    The following health maintenance items are reviewed in Epic and correct as of today:  Health Maintenance Due   Topic Date Due     HEPATITIS C SCREENING  Never done     ZOSTER IMMUNIZATION (2 of 3) 04/03/2015     FALL RISK ASSESSMENT  12/11/2019     COVID-19 Vaccine (3 - Booster for Pfizer series) 08/16/2021     INFLUENZA VACCINE (1) Never done     LIPID  03/14/2022     Patient Active Problem List   Diagnosis     Hearing loss     Cervicalgia     CARDIOVASCULAR SCREENING; LDL GOAL LESS THAN 160     Advanced directives, counseling/discussion     Malignant neoplasm of prostate (H)     Status post total left knee replacement     Left knee DJD     Senile nuclear sclerosis     Prostate cancer (H)     Past Surgical History:   Procedure Laterality Date     ARTHROPLASTY KNEE Left 2/25/2015    Procedure: ARTHROPLASTY KNEE;  Surgeon: Emery Lincoln MD;  Location: [facility]     BIOPSY      Prostate     COLONOSCOPY       DAVINCI LYMPHADENECTOMY Bilateral 7/24/2015    Procedure: DAVINCI LYMPHADENECTOMY;  Surgeon: Rk Stephenson MD;  Location: [facility]     DAVINCI PROSTATECTOMY N/A 7/24/2015    Procedure: DAVINCI PROSTATECTOMY;  Surgeon: Rk Stephenson MD;  Location: [facility]     ENT SURGERY Right 2007    cochlear implant     GENITOURINARY SURGERY  2015    Prostate removal     INJECT EPIDURAL LUMBAR  3/1/2011    INJECT EPIDURAL LUMBAR performed by " GENERIC ANESTHESIA PROVIDER at [facility] OR     PHACOEMULSIFICATION WITH STANDARD INTRAOCULAR LENS IMPLANT Left 5/21/2015    Procedure: PHACOEMULSIFICATION WITH STANDARD INTRAOCULAR LENS IMPLANT;  Surgeon: Romero Funk MD;  Location: [facility]     SURGICAL HISTORY OF -   01/21/2002    Colonoscopy       Social History     Tobacco Use     Smoking status: Never Smoker     Smokeless tobacco: Never Used   Substance Use Topics     Alcohol use: Not Currently     Comment: Rarely     Family History   Problem Relation Age of Onset     Alzheimer Disease Mother      Diabetes Father      Diabetes Brother      Prostate Cancer Brother 75     Colon Cancer Brother      Other Cancer Brother         rectum, lung,brain     Prostate Cancer Brother      Hypertension Brother          Current Outpatient Medications   Medication Sig Dispense Refill     Flaxseed, Linseed, (FLAX SEED OIL) 1000 MG capsule Take 1 capsule by mouth two times daily.       MULTIVITAL OR 1 tablet by mouth daily       No Known Allergies  Pneumonia Vaccine: UTD        Review of Systems   Constitutional: Negative for chills and fever.   HENT: Positive for hearing loss. Negative for congestion, ear pain and sore throat.    Eyes: Negative for pain and visual disturbance.   Respiratory: Negative for cough and shortness of breath.    Cardiovascular: Positive for chest pain. Negative for palpitations and peripheral edema.   Gastrointestinal: Positive for abdominal pain and constipation. Negative for diarrhea, heartburn, hematochezia and nausea.   Genitourinary: Negative for dysuria, frequency, genital sores, hematuria, impotence, penile discharge and urgency.   Musculoskeletal: Positive for arthralgias and myalgias. Negative for joint swelling.   Skin: Negative for rash.   Neurological: Positive for headaches. Negative for dizziness, weakness and paresthesias.   Psychiatric/Behavioral: Negative for mood changes. The patient is not nervous/anxious.        OBJECTIVE:   /70   " Pulse 77   Temp 97.5  F (36.4  C) (Tympanic)   Resp 16   Ht 1.727 m (5' 8\")   Wt 97.6 kg (215 lb 3.2 oz)   SpO2 97%   BMI 32.72 kg/m   Estimated body mass index is 32.72 kg/m  as calculated from the following:    Height as of this encounter: 1.727 m (5' 8\").    Weight as of this encounter: 97.6 kg (215 lb 3.2 oz).  Physical Exam  GENERAL APPEARANCE: healthy, alert and no distress  EYES: pink conj, no icterus, PERRL, EOMI  HENT: ear canals and TM's normal, nose and mouth without ulcers or lesions, oropharynx clear and oral mucous membranes moist  NECK: no adenopathy, no asymmetry, masses, or scars and thyroid normal to palpation  RESP: lungs clear to auscultation - no rales, rhonchi or wheezes  CV: regular rates and rhythm, normal S1 S2, no S3 or S4, no murmur, click or rub, no peripheral edema and peripheral pulses strong  ABDOMEN: soft, nontender, no hepatosplenomegaly, no masses and bowel sounds normal  RECTAL: normal sphincter tone, no rectal masses, prostate slightly enlarged but smooth, soft and nontender  MS: no musculoskeletal defects are noted and gait is age appropriate without ataxia  SKIN: no suspicious lesions or rashes  NEURO: Normal strength and tone, sensory exam grossly normal, mentation intact and speech normal    Diagnostic Test Results:  none     ASSESSMENT / PLAN:   Jaxson was seen today for wellness visit.    Diagnoses and all orders for this visit:    Encounter for Medicare annual wellness exam  Patient was advised on recommended screening and preventive health recommendations.  He verbalized understanding and agreed to the plans below.    Abdominal bloating  Early satiety  -     OFFICE/OUTPT VISIT,EST,LEVL IV  -     Adult Gastro Ref - Procedure Only; Future  -     Comprehensive metabolic panel; Future  Mentioned by patient at end of visit. No weight loss but with early satiety.  Has not been evaluated for this before.  No known gastritis nor other GI conditions.  Due to age, pursue EGD " "to rule out occult esophageal or gastric pathology.  Consider trial of PPI or referral to GI.    Chronic bilateral low back pain without sciatica  -     OFFICE/OUTPT VISIT,EST,LEVL IV  -     XR Lumbar Spine 2/3 Views  No red flags.   Suspect DJD vs spondylosis. Less likely disc herniation.  Advised safe use of otc APAP prn pain.  Activity as tolerated.  If needing more imaging, consider CT lumbar as patient has cochlear implant.    Chronic midline thoracic back pain  -     OFFICE/OUTPT VISIT,EST,LEVL IV  -     XR Thoracic Spine 2 Views  Unclear etiology but patient reports this has been evaluated in [place] a long time ago.  Repeat thoracic spine imaging.  Consider chest imaging if normal spine xray.  No red flags on exam today.    Need for hepatitis C screening test  -     Hepatitis C Screen Reflex to HCV RNA Quant and Genotype; Future    Screening for hyperlipidemia  -     Lipid panel reflex to direct LDL Fasting; Future    Prostate Cancer  Seen and treated by urology in the past.  Patient denies urinary complaints today.    Other orders  -     REVIEW OF HEALTH MAINTENANCE PROTOCOL ORDERS        Patient has been advised of split billing requirements and indicates understanding: Yes    COUNSELING:  Reviewed preventive health counseling, as reflected in patient instructions    Estimated body mass index is 32.72 kg/m  as calculated from the following:    Height as of this encounter: 1.727 m (5' 8\").    Weight as of this encounter: 97.6 kg (215 lb 3.2 oz).    Weight management plan: Discussed healthy diet and exercise guidelines    He reports that he has never smoked. He has never used smokeless tobacco.      Appropriate preventive services were discussed with this patient, including applicable screening as appropriate for cardiovascular disease, diabetes, osteopenia/osteoporosis, and glaucoma.  As appropriate for age/gender, discussed screening for colorectal cancer, prostate cancer, breast cancer, and cervical cancer. " Checklist reviewing preventive services available has been given to the patient.    Reviewed patients plan of care and provided an AVS. The Basic Care Plan (routine screening as documented in Health Maintenance) and Complex Care Plan (for patients with higher acuity and needing more deliberate coordination of services) for Jaxson meets the Care Plan requirement. This Care Plan has been established and reviewed with the Patient.    Counseling Resources:  ATP IV Guidelines  Pooled Cohorts Equation Calculator  Breast Cancer Risk Calculator  Breast Cancer: Medication to Reduce Risk  FRAX Risk Assessment  ICSI Preventive Guidelines  Dietary Guidelines for Americans, 2010  [employer]'s MyPlate  ASA Prophylaxis  Lung CA Screening    Luis Samuel MD  [facility]    Identified Health Risks:    The patient was provided with written information regarding signs of hearing loss.

## 2022-02-22 NOTE — PROGRESS NOTES
"SUBJECTIVE:   Jaxson Chaidez is a 81 year old male who presents for Preventive Visit.      Patient has been advised of split billing requirements and indicates understanding: Yes  Are you in the first 12 months of your Medicare coverage?  No    Healthy Habits:     In general, how would you rate your overall health?  Good    Frequency of exercise:  2-3 days/week    Duration of exercise:  15-30 minutes    Do you usually eat at least 4 servings of fruit and vegetables a day, include whole grains    & fiber and avoid regularly eating high fat or \"junk\" foods?  Yes    Taking medications regularly:  Not Applicable    Medication side effects:  Not applicable    Ability to successfully perform activities of daily living:  No assistance needed    Home Safety:  Lack of grab bars in the bathroom    Hearing Impairment:  Difficulty following a conversation in a noisy restaurant or crowded room, feel that people are mumbling or not speaking clearly, need to ask people to speak up or repeat themselves, difficulty understanding soft or whispered speech and difficulty understanding speech on the telephone    In the past 6 months, have you been bothered by leaking of urine?  No    In general, how would you rate your overall mental or emotional health?  Good      PHQ-2 Total Score: 0    Additional concerns today:  Yes    [facility]    Do you feel safe in your environment? Yes    Have you ever done Advance Care Planning? (For example, a Health Directive, POLST, or a discussion with a medical provider or your loved ones about your wishes): Yes, advance care planning is on file.     Fall risk  Fallen 2 or more times in the past year?: No  Any fall with injury in the past year?: No    Cognitive Screening   1) Repeat 3 items (Leader, Season, Table)    2) Clock draw: NORMAL  3) 3 item recall: Recalls 1 object   Results: NORMAL clock, 1-2 items recalled: COGNITIVE IMPAIRMENT LESS " LIKELY    Mini-CogTM Copyright JUSTIN Aggarwal. Licensed by the author for use in [facility]; reprinted with permission (rich@[facility]). All rights reserved.      Do you have sleep apnea, excessive snoring or daytime drowsiness?: no    Reviewed and updated as needed this visit by clinical staff   Tobacco  Allergies  Meds   Med Hx  Surg Hx  Fam Hx  Soc Hx        Reviewed and updated as needed this visit by Provider   Tobacco               Social History     Tobacco Use     Smoking status: Never Smoker     Smokeless tobacco: Never Used   Substance Use Topics     Alcohol use: Not Currently     Comment: Rarely     If you drink alcohol do you typically have >3 drinks per day or >7 drinks per week? No    Alcohol Use 2/22/2022   Prescreen: >3 drinks/day or >7 drinks/week? No   Prescreen: >3 drinks/day or >7 drinks/week? -           Back Pain       Duration: off and on - restarted a month ago        Specific cause: none    Description:   Location of pain: low back left  Character of pain: dull ache and constant  Pain radiation:radiates into the right buttocks, radiates into the left buttocks and can wrap around the left groin  New numbness or weakness in legs, not attributed to pain:  no     Intensity: mild, moderate    History:   Pain interferes with job: Not applicable  History of back problems: no prior back problems  Any previous MRI or X-rays: None  Sees a specialist for back pain:  No  Therapies tried without relief: NSAIDs    Alleviating factors:   Improved by: none      Precipitating factors:  Worsened by: Nothing    Accompanying Signs & Symptoms:  Risk of Fracture:  Age >64  Risk of Cauda Equina:  None  Risk of Infection:  None  Risk of Cancer:  None  Risk of Ankylosing Spondylitis:  Onset at age <35, male, AND morning back stiffness. no     Reports when he is active he feels coldness and tightness on the chest when he lifts something.  Can also occur randomly together with nausea.  Between shoulder  "blades, feels \"tender\".  Been occurring for many years per patient.  This is not new. He was told that his \"back is all screwed up\" causing the chest discomfort above.      Current providers sharing in care for this patient include:   Patient Care Team:  Luis Samuel MD as PCP - General (Family Medicine)  Rk Stephenson MD as MD (Urology)  Severo, Paris, RN as Nurse Coordinator (Urology)  LUCIANO Bal MD as Referring Physician (Family Practice)  Rk Estes MD as MD (Urology)  Luis Samuel MD as Assigned PCP    The following health maintenance items are reviewed in Epic and correct as of today:  Health Maintenance Due   Topic Date Due     HEPATITIS C SCREENING  Never done     ZOSTER IMMUNIZATION (2 of 3) 04/03/2015     FALL RISK ASSESSMENT  12/11/2019     COVID-19 Vaccine (3 - Booster for Pfizer series) 08/16/2021     INFLUENZA VACCINE (1) Never done     LIPID  03/14/2022     Patient Active Problem List   Diagnosis     Hearing loss     Cervicalgia     CARDIOVASCULAR SCREENING; LDL GOAL LESS THAN 160     Advanced directives, counseling/discussion     Malignant neoplasm of prostate (H)     Status post total left knee replacement     Left knee DJD     Senile nuclear sclerosis     Prostate cancer (H)     Past Surgical History:   Procedure Laterality Date     ARTHROPLASTY KNEE Left 2/25/2015    Procedure: ARTHROPLASTY KNEE;  Surgeon: Emery Lincoln MD;  Location: [facility]     BIOPSY      Prostate     COLONOSCOPY       DAVINCI LYMPHADENECTOMY Bilateral 7/24/2015    Procedure: DAVINCI LYMPHADENECTOMY;  Surgeon: Rk Stephenson MD;  Location: [facility]     DAVINCI PROSTATECTOMY N/A 7/24/2015    Procedure: DAVINCI PROSTATECTOMY;  Surgeon: Rk Stephenson MD;  Location: [facility]     ENT SURGERY Right 2007    cochlear implant     GENITOURINARY SURGERY  2015    Prostate removal     INJECT EPIDURAL LUMBAR  3/1/2011    INJECT EPIDURAL LUMBAR performed by " GENERIC ANESTHESIA PROVIDER at [facility]     PHACOEMULSIFICATION WITH STANDARD INTRAOCULAR LENS IMPLANT Left 5/21/2015    Procedure: PHACOEMULSIFICATION WITH STANDARD INTRAOCULAR LENS IMPLANT;  Surgeon: Rmoero Funk MD;  Location: [facility]     SURGICAL HISTORY OF -   01/21/2002    Colonoscopy       Social History     Tobacco Use     Smoking status: Never Smoker     Smokeless tobacco: Never Used   Substance Use Topics     Alcohol use: Not Currently     Comment: Rarely     Family History   Problem Relation Age of Onset     Alzheimer Disease Mother      Diabetes Father      Diabetes Brother      Prostate Cancer Brother 75     Colon Cancer Brother      Other Cancer Brother         rectum, lung,brain     Prostate Cancer Brother      Hypertension Brother          Current Outpatient Medications   Medication Sig Dispense Refill     Flaxseed, Linseed, (FLAX SEED OIL) 1000 MG capsule Take 1 capsule by mouth two times daily.       MULTIVITAL OR 1 tablet by mouth daily       No Known Allergies  Pneumonia Vaccine: UTD        Review of Systems   Constitutional: Negative for chills and fever.   HENT: Positive for hearing loss. Negative for congestion, ear pain and sore throat.    Eyes: Negative for pain and visual disturbance.   Respiratory: Negative for cough and shortness of breath.    Cardiovascular: Positive for chest pain. Negative for palpitations and peripheral edema.   Gastrointestinal: Positive for abdominal pain and constipation. Negative for diarrhea, heartburn, hematochezia and nausea.   Genitourinary: Negative for dysuria, frequency, genital sores, hematuria, impotence, penile discharge and urgency.   Musculoskeletal: Positive for arthralgias and myalgias. Negative for joint swelling.   Skin: Negative for rash.   Neurological: Positive for headaches. Negative for dizziness, weakness and paresthesias.   Psychiatric/Behavioral: Negative for mood changes. The patient is not nervous/anxious.        OBJECTIVE:   /70   " Pulse 77   Temp 97.5  F (36.4  C) (Tympanic)   Resp 16   Ht 1.727 m (5' 8\")   Wt 97.6 kg (215 lb 3.2 oz)   SpO2 97%   BMI 32.72 kg/m   Estimated body mass index is 32.72 kg/m  as calculated from the following:    Height as of this encounter: 1.727 m (5' 8\").    Weight as of this encounter: 97.6 kg (215 lb 3.2 oz).  Physical Exam  GENERAL APPEARANCE: healthy, alert and no distress  EYES: pink conj, no icterus, PERRL, EOMI  HENT: ear canals and TM's normal, nose and mouth without ulcers or lesions, oropharynx clear and oral mucous membranes moist  NECK: no adenopathy, no asymmetry, masses, or scars and thyroid normal to palpation  RESP: lungs clear to auscultation - no rales, rhonchi or wheezes  CV: regular rates and rhythm, normal S1 S2, no S3 or S4, no murmur, click or rub, no peripheral edema and peripheral pulses strong  ABDOMEN: soft, nontender, no hepatosplenomegaly, no masses and bowel sounds normal  RECTAL: normal sphincter tone, no rectal masses, prostate slightly enlarged but smooth, soft and nontender  MS: no musculoskeletal defects are noted and gait is age appropriate without ataxia  SKIN: no suspicious lesions or rashes  NEURO: Normal strength and tone, sensory exam grossly normal, mentation intact and speech normal    Diagnostic Test Results:  none     ASSESSMENT / PLAN:   Jaxson was seen today for wellness visit.    Diagnoses and all orders for this visit:    Encounter for Medicare annual wellness exam  Patient was advised on recommended screening and preventive health recommendations.  He verbalized understanding and agreed to the plans below.    Abdominal bloating  Early satiety  -     OFFICE/OUTPT VISIT,EST,LEVL IV  -     Adult Gastro Ref - Procedure Only; Future  -     Comprehensive metabolic panel; Future  Mentioned by patient at end of visit. No weight loss but with early satiety.  Has not been evaluated for this before.  No known gastritis nor other GI conditions.  Due to age, pursue EGD " "to rule out occult esophageal or gastric pathology.  Consider trial of PPI or referral to GI.    Chronic bilateral low back pain without sciatica  -     OFFICE/OUTPT VISIT,EST,LEVL IV  -     XR Lumbar Spine 2/3 Views  No red flags.   Suspect DJD vs spondylosis. Less likely disc herniation.  Advised safe use of otc APAP prn pain.  Activity as tolerated.  If needing more imaging, consider CT lumbar as patient has cochlear implant.    Chronic midline thoracic back pain  -     OFFICE/OUTPT VISIT,EST,LEVL IV  -     XR Thoracic Spine 2 Views  Unclear etiology but patient reports this has been evaluated in [place] a long time ago.  Repeat thoracic spine imaging.  Consider chest imaging if normal spine xray.  No red flags on exam today.    Need for hepatitis C screening test  -     Hepatitis C Screen Reflex to HCV RNA Quant and Genotype; Future    Screening for hyperlipidemia  -     Lipid panel reflex to direct LDL Fasting; Future    Prostate Cancer  Seen and treated by urology in the past.  Patient denies urinary complaints today.    Other orders  -     REVIEW OF HEALTH MAINTENANCE PROTOCOL ORDERS        Patient has been advised of split billing requirements and indicates understanding: Yes    COUNSELING:  Reviewed preventive health counseling, as reflected in patient instructions    Estimated body mass index is 32.72 kg/m  as calculated from the following:    Height as of this encounter: 1.727 m (5' 8\").    Weight as of this encounter: 97.6 kg (215 lb 3.2 oz).    Weight management plan: Discussed healthy diet and exercise guidelines    He reports that he has never smoked. He has never used smokeless tobacco.      Appropriate preventive services were discussed with this patient, including applicable screening as appropriate for cardiovascular disease, diabetes, osteopenia/osteoporosis, and glaucoma.  As appropriate for age/gender, discussed screening for colorectal cancer, prostate cancer, breast cancer, and cervical cancer. " Checklist reviewing preventive services available has been given to the patient.    Reviewed patients plan of care and provided an AVS. The Basic Care Plan (routine screening as documented in Health Maintenance) and Complex Care Plan (for patients with higher acuity and needing more deliberate coordination of services) for Jaxson meets the Care Plan requirement. This Care Plan has been established and reviewed with the Patient.    Counseling Resources:  ATP IV Guidelines  Pooled Cohorts Equation Calculator  Breast Cancer Risk Calculator  Breast Cancer: Medication to Reduce Risk  FRAX Risk Assessment  ICSI Preventive Guidelines  Dietary Guidelines for Americans, 2010  "[employer]'s MyPlate  ASA Prophylaxis  Lung CA Screening    Luis Samuel MD  [facility]    Identified Health Risks:    The patient was provided with written information regarding signs of hearing loss.

## 2022-02-22 NOTE — PATIENT INSTRUCTIONS
Schedule fasting lab appointment.    Schedule upper endoscopy to assess the bloating and early saitety symptoms. The  will call you for this.    Xray result to be called to you in the next 1-2 business days. Further recommendations to be given then.    Avoid activities that increase pain.    You may get the Shingrix vaccine for shingles if you desire, and after you verify with insurance how they cover the vaccine.    Get a flu shot every fall. You have declined this in the past.    Preventative Care Visits include: Yearly physicals, Well-child visits, Welcome to Medicare visits, & Medicare yearly wellness exams.    The purpose of these visits is to discuss your medical history and prevent health problems before you are sick.  You may need to pay a copay, coinsurance or deductible if your visit today includes testing or treating for a new or existing condition.    Additional charges may be incurred for today's visit. If you have questions about what your insurance plan covers, please contact your Insurance Company's member service department.  If you have questions specific to a bill you have already received from [employer] please contact the [facility] at [phone].     Patient Education   Personalized Prevention Plan  You are due for the preventive services outlined below.  Your care team is available to assist you in scheduling these services.  If you have already completed any of these items, please share that information with your care team to update in your medical record.  Health Maintenance Due   Topic Date Due     ANNUAL REVIEW OF HM ORDERS  Never done     Hepatitis C Screening  Never done     Zoster (Shingles) Vaccine (2 of 3) 04/03/2015     FALL RISK ASSESSMENT  12/11/2019     COVID-19 Vaccine (3 - Booster for Pfizer series) 08/16/2021     Flu Vaccine (1) Never done     Cholesterol Lab  03/14/2022     Preventive Health Recommendations  See your health care provider every year  to    Review health changes.     Discuss preventive care.      Review your medicines if your doctor has prescribed any.    Talk with your health care provider about whether you should have a test to screen for prostate cancer (PSA).    Every 3 years, have a diabetes test (fasting glucose). If you are at risk for diabetes, you should have this test more often.    Every 5 years, have a cholesterol test. Have this test more often if you are at risk for high cholesterol or heart disease.     Every 10 years, have a colonoscopy. Or, have a yearly FIT test (stool test). These exams will check for colon cancer.    Talk to with your health care provider about screening for Abdominal Aortic Aneurysm if you have a family history of AAA or have a history of smoking.    Shots:     Get a flu shot each year.     Get a tetanus shot every 10 years.     Talk to your doctor about your pneumonia vaccines. There are now two you should receive - Pneumovax (PPSV 23) and Prevnar (PCV 13).    Talk to your pharmacist about a shingles vaccine.     Talk to your doctor about the hepatitis B vaccine.    Nutrition:     Eat at least 5 servings of fruits and vegetables each day.     Eat whole-grain bread, whole-wheat pasta and brown rice instead of white grains and rice.     Get adequate Calcium and Vitamin D.     Lifestyle    Exercise for at least 150 minutes a week (30 minutes a day, 5 days a week). This will help you control your weight and prevent disease.     Limit alcohol to one drink per day.     No smoking.     Wear sunscreen to prevent skin cancer.     See your dentist every six months for an exam and cleaning.     See your eye doctor every 1 to 2 years to screen for conditions such as glaucoma, macular degeneration and cataracts.    Personalized Prevention Plan  You are due for the preventive services outlined below.  Your care team is available to assist you in scheduling these services.  If you have already completed any of these items,  please share that information with your care team to update in your medical record.  Health Maintenance   Topic Date Due     ANNUAL REVIEW OF HM ORDERS  Never done     HEPATITIS C SCREENING  Never done     ZOSTER IMMUNIZATION (2 of 3) 04/03/2015     FALL RISK ASSESSMENT  12/11/2019     COVID-19 Vaccine (3 - Booster for Pfizer series) 08/16/2021     INFLUENZA VACCINE (1) Never done     LIPID  03/14/2022     MEDICARE ANNUAL WELLNESS VISIT  02/22/2023     ADVANCE CARE PLANNING  02/22/2027     DTAP/TDAP/TD IMMUNIZATION (5 - Td or Tdap) 12/04/2027     PHQ-2  Completed     Pneumococcal Vaccine: 65+ Years  Completed     IPV IMMUNIZATION  Aged Out     MENINGITIS IMMUNIZATION  Aged Out     HEPATITIS B IMMUNIZATION  Aged Out       Signs of Hearing Loss      Hearing much better with one ear can be a sign of hearing loss.   Hearing loss is a problem shared by many people. In fact, it is one of the most common health problems, particularly as people age. Most people age 65 and older have some hearing loss. By age 80, almost everyone does. Hearing loss often occurs slowly over the years. So you may not realize your hearing has gotten worse.  Have your hearing checked  Call your healthcare provider if you:    Have to strain to hear normal conversation    Have to watch other people s faces very carefully to follow what they re saying    Need to ask people to repeat what they ve said    Often misunderstand what people are saying    Turn the volume of the television or radio up so high that others complain    Feel that people are mumbling when they re talking to you    Find that the effort to hear leaves you feeling tired and irritated    Notice, when using the phone, that you hear better with one ear than the other  [employer] last reviewed this educational content on 1/1/2020 2000-2021 [employer]. All rights reserved. This information is not intended as a substitute for professional medical care. Always follow your  healthcare professional's instructions.           Patient Education     General Neck and Back Pain    Both neck and back pain are usually caused by injury to the muscles or ligaments of the spine. Sometimes the disks that separate each bone of the spine may cause pain by pressing on a nearby nerve. Back and neck pain may appear after a sudden twisting or bending force (such as in a car accident), or sometimes after a simple awkward movement. In either case, muscle spasm is often present and adds to the pain.   Acute neck and back pain usually gets better in 1 to 2 weeks. Pain related to disk disease, arthritis in the spinal joints, or narrowing of the spinal canal (spinal stenosis) can become chronic and last for months or years.   Back and neck pain are common problems. Most people feel better in 1 or 2 weeks, and most of the rest in 1 to 2 months. Most people can remain active.   People have and describe pain differently.    Pain can be sharp, stabbing, shooting, aching, cramping, or burning    Movement, standing, bending, lifting, sitting, or walking may worsen the pain    Pain can be limited to one spot or area, or it can be more generalized    Pain can spread upward, downward, to the front, or go down your arms or legs    Muscle spasm may occur.  Most of the time mechanical problems with the muscles or spine cause the pain. It's usually caused by an injury, whether known or not, to the muscles or ligaments. Pain without an injury is not common. But it can sometimes be caused by a health problem such as kidney stones or an infection. Pain is usually related to physical activity such as sports, exercise, work, or normal activity. Sometimes it can occur without an identifiable cause. This can happen simply by stretching or moving wrong, without noting pain at the time. Other causes include:     Overexertion, lifting, pushing, pulling incorrectly or too aggressively.    Sudden twisting, bending or stretching from  an accident (car or fall), or accidental movement.    Poor posture    Poor conditioning, lack of regular exercise    Spinal disc disease or arthritis    Stress    Pregnancy, or illness like appendicitis, bladder or kidney infection, pelvic infections   Home care    For neck pain: Use a comfortable pillow that supports the head and keeps the spine in a neutral position. The position of the head should not be tilted forward or backward.    When in bed, try to find a position of comfort. A firm mattress is best. Try lying flat on your back with pillows under your knees. You can also try lying on your side with your knees bent up towards your chest and a pillow between your knees.    At first, don't try to stretch out the sore spots. If there is a strain, it's not like the good soreness you get after exercising without an injury. In this case, stretching may make it worse.    Don't sit for long periods, as in long car rides or other travel. This puts more stress on the lower back than standing or walking.    During the first 24 to 72 hours after an injury, apply an ice pack to the painful area for 20 minutes and then remove it for 20 minutes over a period of 60 to 90 minutes or several times a day.     You can alternate ice and heat therapies. Talk with your healthcare provider about the best treatment for your back or neck pain. As a safety precaution, don't use a heating pad at bedtime. Sleeping with a heating pad can lead to skin burns or tissue damage.    Therapeutic massage can help relax the back and neck muscles without stretching them.    Be aware of safe lifting methods and don't lift anything over 15 pounds until all the pain is gone.    Medicines  Talk to your healthcare provider before using medicine, especially if you have other medical problems or are taking other medicines.     You may use over-the-counter medicine to control pain, unless another pain medicine was prescribed. Talk with your doctor first  if you have chronic conditions like diabetes, liver or kidney disease, stomach ulcers, gastrointestinal bleeding, or are taking blood thinner medicines.    Be careful if you are given pain medicines, narcotics, or medicine for muscle spasm. They can cause drowsiness, and can affect your coordination, reflexes, and judgment. Don't drive or operate heavy machinery.  Follow-up care  Follow up with your healthcare provider, or as advised. You may need physical therapy or further tests.   If X-rays were taken, you will be told of any new findings that may affect your care.   Call 911  Call 911 if any of the following occur:     Trouble breathing    Confusion    Very drowsy or trouble awakening    Fainting or loss of consciousness    Rapid or very slow heart rate    Loss of bowel or bladder control  When to seek medical advice  Call your healthcare provider right away if any of these occur:    Pain becomes worse or spreads into your arms or legs    Weakness, numbness or pain in one or both arms or legs    Numbness in the groin area    Trouble walking    Fever of 100.4 F (38 C) or higher, or as directed by your healthcare provider  [employer] last reviewed this educational content on 10/1/2019    3182-7660 [employer]. All rights reserved. This information is not intended as a substitute for professional medical care. Always follow your healthcare professional's instructions.           Patient Education     Relieving Back Pain  Back pain is a common problem. You can strain back muscles by lifting too much weight or just by moving the wrong way. Back strain can be uncomfortable, even painful. And it can take weeks or months to improve. To help yourself feel better and prevent future back strains, try these tips.  Important: Don't give aspirin to children or teens without first discussing it with your child's healthcare provider.  Ice    Ice reduces muscle pain and swelling. It helps most during the first 24 to  48 hours after an injury.    Wrap an ice pack or a bag of frozen peas in a thin towel. Never put ice directly on your skin.    Place the ice where your back hurts the most.    Don t ice for more than 20 minutes at a time.    You can use ice several times a day.  Medicines  Over-the-counter pain relievers include acetaminophen and anti-inflammatory medicines, which includes aspirin, naproxen, or ibuprofen. They can help ease discomfort. Some also reduce swelling.    Tell your healthcare provider about any medicines you are already taking.    Take medicines only as directed.  Manipulation and massage  Having manipulation by an osteopathic doctor or chiropractor may be helpful. Getting a massage also may help.   Heat  After the first 48 hours, heat can relax sore muscles and improve blood flow.    Try a warm bath or shower. Or use a heating pad set on low. To prevent a burn, keep a cloth between you and the heating pad.    Don t use a heating pad for more than 15 minutes at a time. Never sleep on a heating pad.  [employer] last reviewed this educational content on 6/1/2018 2000-2021 [employer]. All rights reserved. This information is not intended as a substitute for professional medical care. Always follow your healthcare professional's instructions.

## 2022-02-23 DIAGNOSIS — Z11.59 ENCOUNTER FOR SCREENING FOR OTHER VIRAL DISEASES: Primary | ICD-10-CM

## 2022-03-01 ENCOUNTER — LAB (OUTPATIENT)
Dept: LAB | Facility: CLINIC | Age: 82
End: 2022-03-01
Payer: COMMERCIAL

## 2022-03-01 DIAGNOSIS — Z11.59 NEED FOR HEPATITIS C SCREENING TEST: ICD-10-CM

## 2022-03-01 DIAGNOSIS — Z13.220 SCREENING FOR HYPERLIPIDEMIA: ICD-10-CM

## 2022-03-01 DIAGNOSIS — R14.0 ABDOMINAL BLOATING: ICD-10-CM

## 2022-03-01 LAB
ALBUMIN SERPL-MCNC: 3.4 G/DL (ref 3.4–5)
ALP SERPL-CCNC: 49 U/L (ref 40–150)
ALT SERPL W P-5'-P-CCNC: 25 U/L (ref 0–70)
ANION GAP SERPL CALCULATED.3IONS-SCNC: 2 MMOL/L (ref 3–14)
AST SERPL W P-5'-P-CCNC: 19 U/L (ref 0–45)
BILIRUB SERPL-MCNC: 0.6 MG/DL (ref 0.2–1.3)
BUN SERPL-MCNC: 26 MG/DL (ref 7–30)
CALCIUM SERPL-MCNC: 8.8 MG/DL (ref 8.5–10.1)
CHLORIDE BLD-SCNC: 107 MMOL/L (ref 94–109)
CHOLEST SERPL-MCNC: 215 MG/DL
CO2 SERPL-SCNC: 31 MMOL/L (ref 20–32)
CREAT SERPL-MCNC: 1.35 MG/DL (ref 0.66–1.25)
FASTING STATUS PATIENT QL REPORTED: YES
GFR SERPL CREATININE-BSD FRML MDRD: 53 ML/MIN/1.73M2
GLUCOSE BLD-MCNC: 100 MG/DL (ref 70–99)
HCV AB SERPL QL IA: NONREACTIVE
HDLC SERPL-MCNC: 57 MG/DL
LDLC SERPL CALC-MCNC: 134 MG/DL
NONHDLC SERPL-MCNC: 158 MG/DL
POTASSIUM BLD-SCNC: 4.1 MMOL/L (ref 3.4–5.3)
PROT SERPL-MCNC: 7.1 G/DL (ref 6.8–8.8)
SODIUM SERPL-SCNC: 140 MMOL/L (ref 133–144)
TRIGL SERPL-MCNC: 119 MG/DL

## 2022-03-01 PROCEDURE — 86803 HEPATITIS C AB TEST: CPT

## 2022-03-01 PROCEDURE — 36415 COLL VENOUS BLD VENIPUNCTURE: CPT

## 2022-03-01 PROCEDURE — 80061 LIPID PANEL: CPT

## 2022-03-01 PROCEDURE — 80053 COMPREHEN METABOLIC PANEL: CPT

## 2022-03-03 ENCOUNTER — MYC MEDICAL ADVICE (OUTPATIENT)
Dept: FAMILY MEDICINE | Facility: CLINIC | Age: 82
End: 2022-03-03
Payer: COMMERCIAL

## 2022-03-09 ENCOUNTER — ANESTHESIA EVENT (OUTPATIENT)
Dept: GASTROENTEROLOGY | Facility: CLINIC | Age: 82
End: 2022-03-09
Payer: COMMERCIAL

## 2022-03-09 NOTE — ANESTHESIA PREPROCEDURE EVALUATION
Anesthesia Pre-Procedure Evaluation    Patient: Jaxson Chaidez   MRN: [id number] : 1940        Procedure : Procedure(s):  ESOPHAGOGASTRODUODENOSCOPY (EGD)          Past Medical History:   Diagnosis Date     Arthritis      Prostate cancer (H)       Past Surgical History:   Procedure Laterality Date     ARTHROPLASTY KNEE Left 2015    Procedure: ARTHROPLASTY KNEE;  Surgeon: Emery Lincoln MD;  Location: [facility]     BIOPSY      Prostate     COLONOSCOPY       DAVINCI LYMPHADENECTOMY Bilateral 2015    Procedure: DAVINCI LYMPHADENECTOMY;  Surgeon: Rk Stephenson MD;  Location: [facility]     DAVINCI PROSTATECTOMY N/A 2015    Procedure: DAVINCI PROSTATECTOMY;  Surgeon: Rk Stephenson MD;  Location: [facility]     ENT SURGERY Right     cochlear implant     GENITOURINARY SURGERY      Prostate removal     INJECT EPIDURAL LUMBAR  3/1/2011    INJECT EPIDURAL LUMBAR performed by GENERIC ANESTHESIA PROVIDER at [facility]     PHACOEMULSIFICATION WITH STANDARD INTRAOCULAR LENS IMPLANT Left 2015    Procedure: PHACOEMULSIFICATION WITH STANDARD INTRAOCULAR LENS IMPLANT;  Surgeon: Romero Funk MD;  Location: [facility]     SURGICAL HISTORY OF -   2002    Colonoscopy      No Known Allergies   Social History     Tobacco Use     Smoking status: Never Smoker     Smokeless tobacco: Never Used   Substance Use Topics     Alcohol use: Not Currently     Comment: Rarely      Wt Readings from Last 1 Encounters:   22 97.6 kg (215 lb 3.2 oz)        Anesthesia Evaluation   Pt has had prior anesthetic. Type: Regional, MAC and General.        ROS/MED HX  ENT/Pulmonary: Comment: [place]      Neurologic:  - neg neurologic ROS     Cardiovascular:  - neg cardiovascular ROS   (+) -----Previous cardiac testing   Echo: Date: Results:    Stress Test: Date: Results:    ECG Reviewed: Date:  Results:  Sinus Rhythm   WITHIN NORMAL LIMITS  Cath: Date: Results:      METS/Exercise Tolerance:      Hematologic:       Musculoskeletal: Comment: cervicalgia  (+) arthritis,     GI/Hepatic: Comment: Abdominal bloating      Renal/Genitourinary:       Endo:     (+) Obesity,     Psychiatric/Substance Use:  - neg psychiatric ROS     Infectious Disease:       Malignancy:   (+) Malignancy, History of Prostate.    Other:  - neg other ROS          Physical Exam    Airway        Mallampati: I   TM distance: > 3 FB   Neck ROM: full   Mouth opening: > 3 cm    Respiratory Devices and Support         Dental  no notable dental history         Cardiovascular   cardiovascular exam normal          Pulmonary   pulmonary exam normal                OUTSIDE LABS:  CBC:   Lab Results   Component Value Date    WBC 6.6 07/26/2015    WBC 5.3 07/25/2015    HGB 11.2 (L) 07/26/2015    HGB 11.1 (L) 07/25/2015    HCT 33.1 (L) 07/26/2015    HCT 32.9 (L) 07/25/2015     (L) 07/26/2015     (L) 07/25/2015     BMP:   Lab Results   Component Value Date     03/01/2022     03/14/2017    POTASSIUM 4.1 03/01/2022    POTASSIUM 4.4 03/14/2017    CHLORIDE 107 03/01/2022    CHLORIDE 102 03/14/2017    CO2 31 03/01/2022    CO2 31 03/14/2017    BUN 26 03/01/2022    BUN 26 03/14/2017    CR 1.35 (H) 03/01/2022    CR 1.20 03/14/2017     (H) 03/01/2022    GLC 95 03/14/2017     COAGS:   Lab Results   Component Value Date    PTT 23 06/04/2007    INR 2.2 (A) 02/04/2016     POC:   Lab Results   Component Value Date     (H) 07/25/2015     HEPATIC:   Lab Results   Component Value Date    ALBUMIN 3.4 03/01/2022    PROTTOTAL 7.1 03/01/2022    ALT 25 03/01/2022    AST 19 03/01/2022    ALKPHOS 49 03/01/2022    BILITOTAL 0.6 03/01/2022     OTHER:   Lab Results   Component Value Date    PH 7.43 07/24/2015    DANIEL 8.8 03/01/2022    MAG 2.3 01/25/2005    LIPASE 50 03/24/2008    TSH 2.67 01/10/2007    SED 12 01/25/2005       Anesthesia Plan    ASA Status:  3      Anesthesia Type: General.              Consents    Anesthesia Plan(s) and  associated risks, benefits, and realistic alternatives discussed. Questions answered and patient/representative(s) expressed understanding.     - Discussed: Risks, Benefits and Alternatives for BOTH SEDATION and the PROCEDURE were discussed     - Discussed with:  Patient         Postoperative Care            Comments:                HOWARD Walsh CRNA

## 2022-03-10 ENCOUNTER — LAB (OUTPATIENT)
Dept: LAB | Facility: CLINIC | Age: 82
End: 2022-03-10
Payer: COMMERCIAL

## 2022-03-10 DIAGNOSIS — Z11.59 ENCOUNTER FOR SCREENING FOR OTHER VIRAL DISEASES: ICD-10-CM

## 2022-03-10 LAB — SARS-COV-2 RNA RESP QL NAA+PROBE: NEGATIVE

## 2022-03-10 PROCEDURE — U0003 INFECTIOUS AGENT DETECTION BY NUCLEIC ACID (DNA OR RNA); SEVERE ACUTE RESPIRATORY SYNDROME CORONAVIRUS 2 (SARS-COV-2) (CORONAVIRUS DISEASE [COVID-19]), AMPLIFIED PROBE TECHNIQUE, MAKING USE OF HIGH THROUGHPUT TECHNOLOGIES AS DESCRIBED BY CMS-2020-01-R: HCPCS

## 2022-03-10 PROCEDURE — U0005 INFEC AGEN DETEC AMPLI PROBE: HCPCS

## 2022-03-14 ENCOUNTER — ANESTHESIA (OUTPATIENT)
Dept: GASTROENTEROLOGY | Facility: CLINIC | Age: 82
End: 2022-03-14
Payer: COMMERCIAL

## 2022-03-14 ENCOUNTER — HOSPITAL ENCOUNTER (OUTPATIENT)
Facility: CLINIC | Age: 82
Discharge: HOME | End: 2022-03-14
Attending: SURGERY | Admitting: SURGERY
Payer: COMMERCIAL

## 2022-03-14 VITALS
HEART RATE: 71 BPM | HEIGHT: 68 IN | BODY MASS INDEX: 32.58 KG/M2 | DIASTOLIC BLOOD PRESSURE: 76 MMHG | SYSTOLIC BLOOD PRESSURE: 117 MMHG | OXYGEN SATURATION: 96 % | RESPIRATION RATE: 16 BRPM | WEIGHT: 215 LBS | TEMPERATURE: 98 F

## 2022-03-14 LAB — UPPER GI ENDOSCOPY: NORMAL

## 2022-03-14 PROCEDURE — 370N000017 HC ANESTHESIA TECHNICAL FEE, PER MIN: Performed by: SURGERY

## 2022-03-14 PROCEDURE — 250N000011 HC RX IP 250 OP 636: Performed by: NURSE ANESTHETIST, CERTIFIED REGISTERED

## 2022-03-14 PROCEDURE — 250N000009 HC RX 250: Performed by: SURGERY

## 2022-03-14 PROCEDURE — 88305 TISSUE EXAM BY PATHOLOGIST: CPT | Mod: TC | Performed by: SURGERY

## 2022-03-14 PROCEDURE — 250N000009 HC RX 250: Performed by: NURSE ANESTHETIST, CERTIFIED REGISTERED

## 2022-03-14 PROCEDURE — 43239 EGD BIOPSY SINGLE/MULTIPLE: CPT | Performed by: SURGERY

## 2022-03-14 PROCEDURE — 258N000003 HC RX IP 258 OP 636: Performed by: SURGERY

## 2022-03-14 RX ORDER — PROPOFOL 10 MG/ML
INJECTION, EMULSION INTRAVENOUS PRN
Status: DISCONTINUED | OUTPATIENT
Start: 2022-03-14 | End: 2022-03-14

## 2022-03-14 RX ORDER — SODIUM CHLORIDE, SODIUM LACTATE, POTASSIUM CHLORIDE, CALCIUM CHLORIDE 600; 310; 30; 20 MG/100ML; MG/100ML; MG/100ML; MG/100ML
INJECTION, SOLUTION INTRAVENOUS CONTINUOUS
Status: DISCONTINUED | OUTPATIENT
Start: 2022-03-14 | End: 2022-03-14 | Stop reason: HOSPADM

## 2022-03-14 RX ORDER — LIDOCAINE 40 MG/G
CREAM TOPICAL
Status: DISCONTINUED | OUTPATIENT
Start: 2022-03-14 | End: 2022-03-14 | Stop reason: HOSPADM

## 2022-03-14 RX ADMIN — SODIUM CHLORIDE, POTASSIUM CHLORIDE, SODIUM LACTATE AND CALCIUM CHLORIDE: 600; 310; 30; 20 INJECTION, SOLUTION INTRAVENOUS at 08:33

## 2022-03-14 RX ADMIN — LIDOCAINE HYDROCHLORIDE 0.2 ML: 10 INJECTION, SOLUTION EPIDURAL; INFILTRATION; INTRACAUDAL; PERINEURAL at 08:33

## 2022-03-14 RX ADMIN — TOPICAL ANESTHETIC 2 SPRAY: 200 SPRAY DENTAL; PERIODONTAL at 08:45

## 2022-03-14 RX ADMIN — PROPOFOL 200 MG: 10 INJECTION, EMULSION INTRAVENOUS at 08:49

## 2022-03-14 NOTE — INTERVAL H&P NOTE
I have reviewed the surgical (or preoperative) H&P that is linked to this encounter, and examined the patient. There are no significant changes    early satiety; not on PPI; not on blood thinner.    [facility], [place], DO

## 2022-03-14 NOTE — ANESTHESIA POSTPROCEDURE EVALUATION
Patient: Jaxson Chaidez    Procedure: Procedure(s):  ESOPHAGOGASTRODUODENOSCOPY, WITH BIOPSY       Anesthesia Type:  General    Note:  Disposition: Outpatient   Postop Pain Control: Uneventful            Sign Out: Well controlled pain   PONV: No   Neuro/Psych: Uneventful            Sign Out: Acceptable/Baseline neuro status   Airway/Respiratory: Uneventful            Sign Out: Acceptable/Baseline resp. status   CV/Hemodynamics: Uneventful            Sign Out: Acceptable CV status; No obvious hypovolemia; No obvious fluid overload   Other NRE: NONE   DID A NON-ROUTINE EVENT OCCUR? No           Last vitals:  Vitals Value Taken Time   BP     Temp     Pulse     Resp     SpO2         Electronically Signed By: HOWARD Shukla CRNA  March 14, 2022  8:59 AM

## 2022-03-14 NOTE — LETTER
Jaxson Mcguireey  [address] [place] [address]  March 16, 2022    Dear Jaxson,   This letter is to inform you of the results of your pathology report on your upper endoscopy (EGD).    Your pathology report was:  Final Diagnosis   Gastroesophageal junction, biopsy:  -Benign squamous and glandular epithelium in continuity, characteristic of the anatomic area  -Glandular component with focal intestinal metaplasia, please see comment  -No evidence of atypia or malignancy      Electronically signed by Mary Chawla MD on 3/15/2022 at  2:38 PM   Comment    If the biopsy is taken from tubular esophagus and the mucosal irregularity extends to at least 1 cm above the top of gastric folds, then this represents Gurerier esophagus. If the biopsy is taken from gastric cardia, then this represents intestinal metaplasia of the gastric cardia.     Guerrier's Esophagus. Guerrier's Esophagus is not a cancer, but has a higher chance of becoming a cancer compared to normal esophageal tissue.  I recommend you follow-up with your primary care provider and he/she prescribe you omeprazole 20 mg daily for this condition.  You will need to be on this indefinitely.  We will also need a formal biopsy 1 year from now to make sure this Guerrier's has not changed via another upper endoscopy.    If you have any questions or concerns please do not hesitate to call my office at [phone].  Sincerely,     [facility], DO  [facility]

## 2022-03-15 LAB
PATH REPORT.COMMENTS IMP SPEC: NORMAL
PATH REPORT.FINAL DX SPEC: NORMAL
PATH REPORT.GROSS SPEC: NORMAL
PATH REPORT.MICROSCOPIC SPEC OTHER STN: NORMAL
PATH REPORT.RELEVANT HX SPEC: NORMAL
PHOTO IMAGE: NORMAL

## 2022-03-15 PROCEDURE — 88305 TISSUE EXAM BY PATHOLOGIST: CPT | Mod: 26 | Performed by: PATHOLOGY

## 2022-03-16 ENCOUNTER — MYC MEDICAL ADVICE (OUTPATIENT)
Dept: FAMILY MEDICINE | Facility: CLINIC | Age: 82
End: 2022-03-16

## 2022-03-16 ENCOUNTER — LAB (OUTPATIENT)
Dept: LAB | Facility: CLINIC | Age: 82
End: 2022-03-16
Payer: COMMERCIAL

## 2022-03-16 DIAGNOSIS — K22.70 BARRETT'S ESOPHAGUS DETERMINED BY ENDOSCOPY: Primary | ICD-10-CM

## 2022-03-16 DIAGNOSIS — R79.89 ELEVATED SERUM CREATININE: ICD-10-CM

## 2022-03-16 LAB
ANION GAP SERPL CALCULATED.3IONS-SCNC: <1 MMOL/L (ref 3–14)
BUN SERPL-MCNC: 28 MG/DL (ref 7–30)
CALCIUM SERPL-MCNC: 8.7 MG/DL (ref 8.5–10.1)
CHLORIDE BLD-SCNC: 108 MMOL/L (ref 94–109)
CO2 SERPL-SCNC: 32 MMOL/L (ref 20–32)
CREAT SERPL-MCNC: 1.16 MG/DL (ref 0.66–1.25)
GFR SERPL CREATININE-BSD FRML MDRD: 64 ML/MIN/1.73M2
GLUCOSE BLD-MCNC: 94 MG/DL (ref 70–99)
POTASSIUM BLD-SCNC: 4.4 MMOL/L (ref 3.4–5.3)
SODIUM SERPL-SCNC: 140 MMOL/L (ref 133–144)

## 2022-03-16 PROCEDURE — 36415 COLL VENOUS BLD VENIPUNCTURE: CPT

## 2022-03-16 PROCEDURE — 80048 BASIC METABOLIC PNL TOTAL CA: CPT

## 2022-03-18 NOTE — TELEPHONE ENCOUNTER
Routed to provider.  Please see [employer] message from pt.  Pt is asking for new prescription for omeprazole for Guerrier esophagus until he can be seen for follow up.  Silvana Quick RN

## 2022-03-21 RX ORDER — NICOTINE POLACRILEX 4 MG/1
20 GUM, CHEWING ORAL DAILY
Qty: 14 TABLET | Refills: 0 | Status: SHIPPED | OUTPATIENT
Start: 2022-03-21 | End: 2022-04-12

## 2022-04-12 ENCOUNTER — OFFICE VISIT (OUTPATIENT)
Dept: FAMILY MEDICINE | Facility: CLINIC | Age: 82
End: 2022-04-12
Payer: COMMERCIAL

## 2022-04-12 VITALS
WEIGHT: 217.2 LBS | HEART RATE: 64 BPM | HEIGHT: 68 IN | SYSTOLIC BLOOD PRESSURE: 136 MMHG | TEMPERATURE: 97.6 F | OXYGEN SATURATION: 99 % | RESPIRATION RATE: 16 BRPM | BODY MASS INDEX: 32.92 KG/M2 | DIASTOLIC BLOOD PRESSURE: 78 MMHG

## 2022-04-12 DIAGNOSIS — K22.70 BARRETT'S ESOPHAGUS DETERMINED BY ENDOSCOPY: ICD-10-CM

## 2022-04-12 DIAGNOSIS — R14.0 ABDOMINAL BLOATING: Primary | ICD-10-CM

## 2022-04-12 DIAGNOSIS — R68.81 EARLY SATIETY: ICD-10-CM

## 2022-04-12 PROCEDURE — 99213 OFFICE O/P EST LOW 20 MIN: CPT | Performed by: FAMILY MEDICINE

## 2022-04-12 RX ORDER — NICOTINE POLACRILEX 4 MG/1
20 GUM, CHEWING ORAL DAILY
Qty: 90 TABLET | Refills: 0 | Status: SHIPPED | OUTPATIENT
Start: 2022-04-12 | End: 2023-04-26

## 2022-04-12 ASSESSMENT — PAIN SCALES - GENERAL: PAINLEVEL: MODERATE PAIN (4)

## 2022-04-12 NOTE — PATIENT INSTRUCTIONS
The  for gastroenterology clinic in [place] will call you in the next few business days.    Continue omeprazole 20 mg daily.

## 2022-04-12 NOTE — PROGRESS NOTES
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  Reviewed endoscopy results with patient . Nothing acute.  Will keep on PPI for now.  Consult GI recommended. Patient concurred.  Reinforced acid reflux prevention.  Return precautions discussed and given to patient.   6}     Patient Instructions   The  for gastroenterology clinic in [place] will call you in the next few business days.    Continue omeprazole 20 mg daily.      Literature about patient's conditions and other advice has been attached to her after visit summary.    Return in about 1 month (around 5/12/2022) for In-clinic visit for if you have worsening digestive symptoms.    Luis Samuel MD  [facility] is a 82 year old who presents for the following health issues   Chief Complaint   Patient presents with     Medication Follow-up     Pt being seen for a f/unit(s) on omeprazole.       HPI     Medication Followup of omeprazole    Taking Medication as prescribed: yes, pt took last one this morning    Side Effects:  None    Medication Helping Symptoms:  Pt unsure if he feels any better    Patient still reports abd bloating and feeling full easily.   Denies emesis, nausea, loss of appetite or weight change.     Patient had UGI endoscopy 3/14/2022. Results are:    Impression:            - Z-line irregular, 38 cm from the incisors. Biopsied.                          - Normal stomach.                          - Normal ampulla, duodenal bulb, first portion of the                          duodenum and second portion of the duodenum.   Final Diagnosis   Gastroesophageal junction, biopsy:  -Benign squamous and glandular epithelium in continuity, characteristic of the anatomic area  -Glandular  "component with focal intestinal metaplasia, please see comment  -No evidence of atypia or malignancy     Patient denies any new symptoms.    Review of Systems   Constitutional, HEENT, cardiovascular, pulmonary, GI, , musculoskeletal, neuro, skin, endocrine and psych systems are negative, except as otherwise noted.      Objective    /78   Pulse 64   Temp 97.6  F (36.4  C) (Tympanic)   Resp 16   Ht 1.727 m (5' 8\")   Wt 98.5 kg (217 lb 3.2 oz)   SpO2 99%   BMI 33.03 kg/m    Body mass index is 33.03 kg/m .  Physical Exam   GEN: alert, oriented x 3, NAD  EYES: no icterus  ABD: rounded, non-distended, non-tender, no palpable mass, no palpable organomegaly    Lab on 03/16/2022   Component Date Value Ref Range Status     Sodium 03/16/2022 140  133 - 144 mmol/L Final     Potassium 03/16/2022 4.4  3.4 - 5.3 mmol/L Final     Chloride 03/16/2022 108  94 - 109 mmol/L Final     Carbon Dioxide (CO2) 03/16/2022 32  20 - 32 mmol/L Final     Anion Gap 03/16/2022 <1 (A) 3 - 14 mmol/L Final     Urea Nitrogen 03/16/2022 28  7 - 30 mg/dL Final     Creatinine 03/16/2022 1.16  0.66 - 1.25 mg/dL Final     Calcium 03/16/2022 8.7  8.5 - 10.1 mg/dL Final     Glucose 03/16/2022 94  70 - 99 mg/dL Final     GFR Estimate 03/16/2022 64  >60 mL/min/1.73m2 Final    Effective December 21, 2021 eGFRcr in adults is calculated using the 2021 CKD-EPI creatinine equation which includes age and gender (Rosanna et al., NEJM, DOI: 10.1056/EBSTiv9952091)           "

## 2022-04-13 ENCOUNTER — MYC MEDICAL ADVICE (OUTPATIENT)
Dept: FAMILY MEDICINE | Facility: CLINIC | Age: 82
End: 2022-04-13
Payer: COMMERCIAL

## 2022-06-30 ENCOUNTER — MYC MEDICAL ADVICE (OUTPATIENT)
Dept: FAMILY MEDICINE | Facility: CLINIC | Age: 82
End: 2022-06-30

## 2022-07-22 ENCOUNTER — MYC MEDICAL ADVICE (OUTPATIENT)
Dept: UROLOGY | Facility: CLINIC | Age: 82
End: 2022-07-22

## 2022-07-22 DIAGNOSIS — C61 PROSTATE CANCER (H): Primary | ICD-10-CM

## 2022-08-01 ENCOUNTER — LAB (OUTPATIENT)
Dept: LAB | Facility: CLINIC | Age: 82
End: 2022-08-01
Payer: COMMERCIAL

## 2022-08-01 DIAGNOSIS — C61 PROSTATE CANCER (H): ICD-10-CM

## 2022-08-01 LAB — PSA SERPL-MCNC: <0.01 UG/L (ref 0–4)

## 2022-08-01 PROCEDURE — 84153 ASSAY OF PSA TOTAL: CPT

## 2022-08-01 PROCEDURE — 36415 COLL VENOUS BLD VENIPUNCTURE: CPT

## 2022-11-19 ENCOUNTER — HEALTH MAINTENANCE LETTER (OUTPATIENT)
Age: 82
End: 2022-11-19

## 2022-12-09 ENCOUNTER — TRANSFERRED RECORDS (OUTPATIENT)
Dept: HEALTH INFORMATION MANAGEMENT | Facility: CLINIC | Age: 82
End: 2022-12-09

## 2023-03-01 ENCOUNTER — ANCILLARY PROCEDURE (OUTPATIENT)
Dept: GENERAL RADIOLOGY | Facility: CLINIC | Age: 83
End: 2023-03-01
Attending: FAMILY MEDICINE
Payer: COMMERCIAL

## 2023-03-01 ENCOUNTER — OFFICE VISIT (OUTPATIENT)
Dept: FAMILY MEDICINE | Facility: CLINIC | Age: 83
End: 2023-03-01
Payer: COMMERCIAL

## 2023-03-01 VITALS
OXYGEN SATURATION: 98 % | BODY MASS INDEX: 32.8 KG/M2 | SYSTOLIC BLOOD PRESSURE: 134 MMHG | HEIGHT: 68 IN | TEMPERATURE: 98.3 F | DIASTOLIC BLOOD PRESSURE: 72 MMHG | WEIGHT: 216.4 LBS | RESPIRATION RATE: 20 BRPM | HEART RATE: 74 BPM

## 2023-03-01 DIAGNOSIS — G89.29 CHRONIC MIDLINE THORACIC BACK PAIN: Primary | ICD-10-CM

## 2023-03-01 DIAGNOSIS — Z86.0100 HISTORY OF COLONIC POLYPS: ICD-10-CM

## 2023-03-01 DIAGNOSIS — M54.6 CHRONIC MIDLINE THORACIC BACK PAIN: Primary | ICD-10-CM

## 2023-03-01 DIAGNOSIS — Z12.11 SCREENING FOR MALIGNANT NEOPLASM OF COLON: ICD-10-CM

## 2023-03-01 DIAGNOSIS — C61 PROSTATE CANCER (H): ICD-10-CM

## 2023-03-01 DIAGNOSIS — Z87.828 HISTORY OF MOTOR VEHICLE ACCIDENT: ICD-10-CM

## 2023-03-01 PROCEDURE — 99213 OFFICE O/P EST LOW 20 MIN: CPT | Performed by: FAMILY MEDICINE

## 2023-03-01 PROCEDURE — 72070 X-RAY EXAM THORAC SPINE 2VWS: CPT | Mod: TC | Performed by: RADIOLOGY

## 2023-03-01 ASSESSMENT — PAIN SCALES - GENERAL: PAINLEVEL: MODERATE PAIN (4)

## 2023-03-01 NOTE — PATIENT INSTRUCTIONS
Colonoscopy  will call you in the next 3-5 business days to set up appointment for the procedure. Referral information is in your visit summary also.     Result of xray will be relayed to you in the next 24 hours.  Possible physical therapy if spine looks stable.    Acetaminophen 500 mg orally 1-2 tabs every 4-6 hrs as needed for pain.  May take May take Naproxen 220 mg orally with food every 12 hrs as needed for pain not relieved by acetaminophen. Try to take naproxen seldomly.
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
## 2023-03-01 NOTE — ADDENDUM NOTE
Addended by: MALINDA ZALDIVAR on: 3/1/2023 12:50 PM     Modules accepted: Orders     Discussed AREDS 2 supplements, UV protection and green leafy vegetables.

## 2023-03-20 ENCOUNTER — TELEPHONE (OUTPATIENT)
Dept: SURGERY | Facility: CLINIC | Age: 83
End: 2023-03-20
Payer: COMMERCIAL

## 2023-03-20 NOTE — TELEPHONE ENCOUNTER
Screening Questions  BLUE  KIND OF PREP RED  LOCATION [review exclusion criteria] GREEN  SEDATION TYPE        y Are you active on mychart?       Samuel Ordering/Referring Provider?        [facility] What type of coverage do you have?      n Have you had a positive covid test in the last 14 days?     33.15 1. BMI  [BMI 40+ - review exclusion criteria]    y  2. Are you able to give consent for your medical care? [IF NO,RN REVIEW]          n  3. Are you taking any prescription pain medications on a routine schedule   (ex narcotics: oxycodone, roxicodone, oxycontin,  and percocet)? [RN Review]        n  3a. EXTENDED PREP What kind of prescription?     n 4. Do you have any chemical dependencies such as alcohol, street drugs, or methadone?        **If yes 3- 5 , please schedule with MAC sedation.**          IF YES TO ANY 6 - 10 - HOSPITAL SETTING ONLY.     n 6.   Do you need assistance transferring?     n 7.   Have you had a heart or lung transplant?    n 8.   Are you currently on dialysis?   n 9.   Do you use daily home oxygen?   n 10. Do you take nitroglycerin?   10a. n If yes, how often?     11. [FEMALES]  n Are you currently pregnant?    11a. n If yes, how many weeks? [ Greater than 12 weeks, OR NEEDED]    n 12. Do you have Pulmonary Hypertension? *NEED PAC APPT AT [facility] w/ MAC*     n 13. [review exclusion criteria]  Do you have any implantable devices in your body (pacemaker, defib, LVAD)?    n 14. In the past 6 months, have you had any heart related issues including cardiomyopathy or heart attack?     14a. n If yes, did it require cardiac stenting if so when?     n 15. Have you had a stroke or Transient ischemic attack (TIA - aka  mini stroke ) within 6 months?      n 16. Do you have mod to severe Obstructive Sleep Apnea?  [Hospital only]    n 17. Do you have SEVERE AND UNCONTROLLED asthma? *NEED PAC APPT AT [facility] w/MAC*     18. Are you currently taking any blood thinners?     18a. No. Continue to 19.   18b.  "Yes/no Blood Thinner: No [CONTINUE TO #19]    n 19. Do you take the medication Phentermine?    19a. If yes, \"Hold for 7 days before procedure.  Please consult your prescribing provider if you have questions about holding this medication.\"     n  20. Do you have chronic kidney disease?      n  21. Do you have a diagnosis of diabetes?     n  22. On a regular basis do you go 3-5 days between bowel movements?     y 23. Preferred LOCAL Pharmacy for Pre Prescription    [ LIST ONLY ONE PHARMACY]     [facility] - [place], [place] - [address]        - CLOSING REMINDERS -    Informed patient they will need an adult    Cannot take any type of public or medical transportation alone    Conscious Sedation- Needs  for 6 hours after the procedure       MAC/General-Needs  for 24 hours after procedure    Pre-Procedure Covid test to be completed [[facility] PCR Testing Required]    Confirmed Nurse will call to complete assessment       - SCHEDULING DETAILS -  n Hospital Setting Required? If yes, what is the exclusion?: josh Villarreal  Surgeon    4/24/23  Date of Procedure  Lower Endoscopy [Colonoscopy]  Type of Procedure Scheduled  [facility]-If you answer yes to questions #8, #20, #21Which Colonoscopy Prep was Sent?     general Sedation Type     n PAC / Pre-op Required                 "

## 2023-03-24 ENCOUNTER — TRANSFERRED RECORDS (OUTPATIENT)
Dept: HEALTH INFORMATION MANAGEMENT | Facility: CLINIC | Age: 83
End: 2023-03-24

## 2023-03-28 ENCOUNTER — LAB (OUTPATIENT)
Dept: LAB | Facility: CLINIC | Age: 83
End: 2023-03-28
Payer: COMMERCIAL

## 2023-03-28 DIAGNOSIS — Z96.659 PAIN DUE TO TOTAL KNEE REPLACEMENT (H): Primary | ICD-10-CM

## 2023-03-28 DIAGNOSIS — T84.84XA PAIN DUE TO TOTAL KNEE REPLACEMENT (H): Primary | ICD-10-CM

## 2023-03-28 LAB
BASOPHILS # BLD AUTO: 0 10E3/UL (ref 0–0.2)
BASOPHILS NFR BLD AUTO: 1 %
CRP SERPL-MCNC: 3.18 MG/L
EOSINOPHIL # BLD AUTO: 0.2 10E3/UL (ref 0–0.7)
EOSINOPHIL NFR BLD AUTO: 3 %
ERYTHROCYTE [DISTWIDTH] IN BLOOD BY AUTOMATED COUNT: 16.8 % (ref 10–15)
ERYTHROCYTE [SEDIMENTATION RATE] IN BLOOD BY WESTERGREN METHOD: 9 MM/HR (ref 0–20)
HCT VFR BLD AUTO: 39.7 % (ref 40–53)
HGB BLD-MCNC: 12.8 G/DL (ref 13.3–17.7)
IMM GRANULOCYTES # BLD: 0 10E3/UL
IMM GRANULOCYTES NFR BLD: 0 %
LYMPHOCYTES # BLD AUTO: 1.7 10E3/UL (ref 0.8–5.3)
LYMPHOCYTES NFR BLD AUTO: 31 %
MCH RBC QN AUTO: 28.2 PG (ref 26.5–33)
MCHC RBC AUTO-ENTMCNC: 32.2 G/DL (ref 31.5–36.5)
MCV RBC AUTO: 87 FL (ref 78–100)
MONOCYTES # BLD AUTO: 0.6 10E3/UL (ref 0–1.3)
MONOCYTES NFR BLD AUTO: 11 %
NEUTROPHILS # BLD AUTO: 3 10E3/UL (ref 1.6–8.3)
NEUTROPHILS NFR BLD AUTO: 54 %
PLATELET # BLD AUTO: 222 10E3/UL (ref 150–450)
RBC # BLD AUTO: 4.54 10E6/UL (ref 4.4–5.9)
WBC # BLD AUTO: 5.6 10E3/UL (ref 4–11)

## 2023-03-28 PROCEDURE — 86140 C-REACTIVE PROTEIN: CPT

## 2023-03-28 PROCEDURE — 85652 RBC SED RATE AUTOMATED: CPT

## 2023-03-28 PROCEDURE — 85025 COMPLETE CBC W/AUTO DIFF WBC: CPT

## 2023-03-28 PROCEDURE — 36415 COLL VENOUS BLD VENIPUNCTURE: CPT

## 2023-04-09 ENCOUNTER — HEALTH MAINTENANCE LETTER (OUTPATIENT)
Age: 83
End: 2023-04-09

## 2023-04-17 ENCOUNTER — TRANSFERRED RECORDS (OUTPATIENT)
Dept: HEALTH INFORMATION MANAGEMENT | Facility: CLINIC | Age: 83
End: 2023-04-17
Payer: COMMERCIAL

## 2023-04-17 RX ORDER — BISACODYL 5 MG/1
TABLET, DELAYED RELEASE ORAL
Qty: 4 TABLET | Refills: 0 | Status: SHIPPED | OUTPATIENT
Start: 2023-04-17 | End: 2023-04-24

## 2023-04-21 ENCOUNTER — ANESTHESIA EVENT (OUTPATIENT)
Dept: GASTROENTEROLOGY | Facility: CLINIC | Age: 83
End: 2023-04-21
Payer: COMMERCIAL

## 2023-04-21 NOTE — ANESTHESIA PREPROCEDURE EVALUATION
Anesthesia Pre-Procedure Evaluation    Patient: Jaxson Chaidez   MRN: [id number] : 1940        Procedure : Procedure(s):  ESOPHAGOGASTRODUODENOSCOPY (EGD)          Past Medical History:   Diagnosis Date     Arthritis      Prostate cancer (H)       Past Surgical History:   Procedure Laterality Date     ARTHROPLASTY KNEE Left 2015    Procedure: ARTHROPLASTY KNEE;  Surgeon: Emery Lincoln MD;  Location: [facility]     BIOPSY      Prostate     COLONOSCOPY       DAVINCI LYMPHADENECTOMY Bilateral 2015    Procedure: DAVINCI LYMPHADENECTOMY;  Surgeon: Rk Stephenson MD;  Location: [facility]     DAVINCI PROSTATECTOMY N/A 2015    Procedure: DAVINCI PROSTATECTOMY;  Surgeon: Rk Stephenson MD;  Location:  OR     ENT SURGERY Right     cochlear implant     ESOPHAGOSCOPY, GASTROSCOPY, DUODENOSCOPY (EGD), COMBINED N/A 3/14/2022    Procedure: ESOPHAGOGASTRODUODENOSCOPY, WITH BIOPSY;  Surgeon: Angie Boggs MD;  Location: [facility]     GENITOURINARY SURGERY      Prostate removal     INJECT EPIDURAL LUMBAR  3/1/2011    INJECT EPIDURAL LUMBAR performed by GENERIC ANESTHESIA PROVIDER at [facility] OR     PHACOEMULSIFICATION WITH STANDARD INTRAOCULAR LENS IMPLANT Left 2015    Procedure: PHACOEMULSIFICATION WITH STANDARD INTRAOCULAR LENS IMPLANT;  Surgeon: Romero Funk MD;  Location: [facility] OR     SURGICAL HISTORY OF -   2002    Colonoscopy      No Known Allergies   Social History     Tobacco Use     Smoking status: Never     Smokeless tobacco: Never   Vaping Use     Vaping status: Never Used   Substance Use Topics     Alcohol use: Not Currently     Comment: Rarely      Wt Readings from Last 1 Encounters:   23 98.2 kg (216 lb 6.4 oz)        Anesthesia Evaluation   Pt has had prior anesthetic. Type: Regional, MAC and General.        ROS/MED HX  ENT/Pulmonary: Comment: [place]      Neurologic:  - neg neurologic ROS     Cardiovascular:  - neg cardiovascular ROS   (+)  -----Previous cardiac testing   Echo: Date: Results:    Stress Test: Date: Results:    ECG Reviewed: Date: 7-2015 Results:  Sinus Rhythm   WITHIN NORMAL LIMITS  Cath: Date: Results:      METS/Exercise Tolerance:     Hematologic:       Musculoskeletal: Comment: cervicalgia  (+) arthritis,     GI/Hepatic: Comment: Abdominal bloating      Renal/Genitourinary:       Endo:     (+) Obesity,     Psychiatric/Substance Use:  - neg psychiatric ROS     Infectious Disease:       Malignancy:   (+) Malignancy, History of Prostate.    Other:  - neg other ROS          Physical Exam    Airway        Mallampati: I   TM distance: > 3 FB   Neck ROM: full   Mouth opening: > 3 cm    Respiratory Devices and Support         Dental  no notable dental history     (+) Minor Abnormalities - some fillings, tiny chips      Cardiovascular   cardiovascular exam normal          Pulmonary   pulmonary exam normal                OUTSIDE LABS:  CBC:   Lab Results   Component Value Date    WBC 5.6 03/28/2023    WBC 6.6 07/26/2015    HGB 12.8 (L) 03/28/2023    HGB 11.2 (L) 07/26/2015    HCT 39.7 (L) 03/28/2023    HCT 33.1 (L) 07/26/2015     03/28/2023     (L) 07/26/2015     BMP:   Lab Results   Component Value Date     03/16/2022     03/01/2022    POTASSIUM 4.4 03/16/2022    POTASSIUM 4.1 03/01/2022    CHLORIDE 108 03/16/2022    CHLORIDE 107 03/01/2022    CO2 32 03/16/2022    CO2 31 03/01/2022    BUN 28 03/16/2022    BUN 26 03/01/2022    CR 1.16 03/16/2022    CR 1.35 (H) 03/01/2022    GLC 94 03/16/2022     (H) 03/01/2022     COAGS:   Lab Results   Component Value Date    PTT 23 06/04/2007    INR 2.2 (A) 02/04/2016     POC:   Lab Results   Component Value Date     (H) 07/25/2015     HEPATIC:   Lab Results   Component Value Date    ALBUMIN 3.4 03/01/2022    PROTTOTAL 7.1 03/01/2022    ALT 25 03/01/2022    AST 19 03/01/2022    ALKPHOS 49 03/01/2022    BILITOTAL 0.6 03/01/2022     OTHER:   Lab Results   Component Value  Date    PH 7.43 07/24/2015    DANIEL 8.7 03/16/2022    MAG 2.3 01/25/2005    LIPASE 50 03/24/2008    TSH 2.67 01/10/2007    SED 9 03/28/2023       Anesthesia Plan    ASA Status:  3      Anesthesia Type: General.              Consents    Anesthesia Plan(s) and associated risks, benefits, and realistic alternatives discussed. Questions answered and patient/representative(s) expressed understanding.     - Discussed: Risks, Benefits and Alternatives for BOTH SEDATION and the PROCEDURE were discussed     - Discussed with:  Patient         Postoperative Care            Comments:                    HOWARD Cohen CRNA

## 2023-04-24 ENCOUNTER — ANESTHESIA (OUTPATIENT)
Dept: GASTROENTEROLOGY | Facility: CLINIC | Age: 83
End: 2023-04-24
Payer: COMMERCIAL

## 2023-04-24 ENCOUNTER — HOSPITAL ENCOUNTER (OUTPATIENT)
Facility: CLINIC | Age: 83
Discharge: HOME | End: 2023-04-24
Attending: SURGERY | Admitting: SURGERY
Payer: COMMERCIAL

## 2023-04-24 VITALS
SYSTOLIC BLOOD PRESSURE: 129 MMHG | WEIGHT: 216 LBS | OXYGEN SATURATION: 97 % | TEMPERATURE: 98.1 F | HEIGHT: 68 IN | BODY MASS INDEX: 32.74 KG/M2 | DIASTOLIC BLOOD PRESSURE: 78 MMHG | HEART RATE: 72 BPM | RESPIRATION RATE: 20 BRPM

## 2023-04-24 DIAGNOSIS — Z12.11 SPECIAL SCREENING FOR MALIGNANT NEOPLASMS, COLON: Primary | ICD-10-CM

## 2023-04-24 LAB — COLONOSCOPY: NORMAL

## 2023-04-24 PROCEDURE — 88305 TISSUE EXAM BY PATHOLOGIST: CPT | Mod: TC | Performed by: SURGERY

## 2023-04-24 PROCEDURE — 258N000003 HC RX IP 258 OP 636: Performed by: SURGERY

## 2023-04-24 PROCEDURE — 45385 COLONOSCOPY W/LESION REMOVAL: CPT | Mod: PT | Performed by: SURGERY

## 2023-04-24 PROCEDURE — 45385 COLONOSCOPY W/LESION REMOVAL: CPT | Mod: PT

## 2023-04-24 PROCEDURE — 45380 COLONOSCOPY AND BIOPSY: CPT | Mod: PT | Performed by: SURGERY

## 2023-04-24 PROCEDURE — 250N000011 HC RX IP 250 OP 636: Performed by: NURSE ANESTHETIST, CERTIFIED REGISTERED

## 2023-04-24 PROCEDURE — 250N000009 HC RX 250: Performed by: SURGERY

## 2023-04-24 PROCEDURE — 45380 COLONOSCOPY AND BIOPSY: CPT | Mod: 59 | Performed by: SURGERY

## 2023-04-24 PROCEDURE — 370N000017 HC ANESTHESIA TECHNICAL FEE, PER MIN: Performed by: SURGERY

## 2023-04-24 RX ORDER — SODIUM CHLORIDE, SODIUM LACTATE, POTASSIUM CHLORIDE, CALCIUM CHLORIDE 600; 310; 30; 20 MG/100ML; MG/100ML; MG/100ML; MG/100ML
INJECTION, SOLUTION INTRAVENOUS CONTINUOUS
Status: DISCONTINUED | OUTPATIENT
Start: 2023-04-24 | End: 2023-04-24 | Stop reason: HOSPADM

## 2023-04-24 RX ORDER — NALOXONE HYDROCHLORIDE 0.4 MG/ML
0.2 INJECTION, SOLUTION INTRAMUSCULAR; INTRAVENOUS; SUBCUTANEOUS
Status: DISCONTINUED | OUTPATIENT
Start: 2023-04-24 | End: 2023-04-24 | Stop reason: HOSPADM

## 2023-04-24 RX ORDER — LIDOCAINE 40 MG/G
CREAM TOPICAL
Status: DISCONTINUED | OUTPATIENT
Start: 2023-04-24 | End: 2023-04-24 | Stop reason: HOSPADM

## 2023-04-24 RX ORDER — FLUMAZENIL 0.1 MG/ML
0.2 INJECTION, SOLUTION INTRAVENOUS
Status: DISCONTINUED | OUTPATIENT
Start: 2023-04-24 | End: 2023-04-24 | Stop reason: HOSPADM

## 2023-04-24 RX ORDER — NALOXONE HYDROCHLORIDE 0.4 MG/ML
0.4 INJECTION, SOLUTION INTRAMUSCULAR; INTRAVENOUS; SUBCUTANEOUS
Status: DISCONTINUED | OUTPATIENT
Start: 2023-04-24 | End: 2023-04-24 | Stop reason: HOSPADM

## 2023-04-24 RX ORDER — ONDANSETRON 2 MG/ML
4 INJECTION INTRAMUSCULAR; INTRAVENOUS
Status: DISCONTINUED | OUTPATIENT
Start: 2023-04-24 | End: 2023-04-24 | Stop reason: HOSPADM

## 2023-04-24 RX ORDER — PROPOFOL 10 MG/ML
INJECTION, EMULSION INTRAVENOUS PRN
Status: DISCONTINUED | OUTPATIENT
Start: 2023-04-24 | End: 2023-04-24

## 2023-04-24 RX ADMIN — SODIUM CHLORIDE, POTASSIUM CHLORIDE, SODIUM LACTATE AND CALCIUM CHLORIDE: 600; 310; 30; 20 INJECTION, SOLUTION INTRAVENOUS at 10:18

## 2023-04-24 RX ADMIN — PROPOFOL 75 MG: 10 INJECTION, EMULSION INTRAVENOUS at 10:52

## 2023-04-24 RX ADMIN — PROPOFOL 75 MG: 10 INJECTION, EMULSION INTRAVENOUS at 10:50

## 2023-04-24 RX ADMIN — LIDOCAINE HYDROCHLORIDE 0.1 ML: 10 INJECTION, SOLUTION EPIDURAL; INFILTRATION; INTRACAUDAL; PERINEURAL at 10:19

## 2023-04-24 RX ADMIN — PROPOFOL 75 MG: 10 INJECTION, EMULSION INTRAVENOUS at 10:48

## 2023-04-24 ASSESSMENT — ACTIVITIES OF DAILY LIVING (ADL): ADLS_ACUITY_SCORE: 35

## 2023-04-24 NOTE — ANESTHESIA CARE TRANSFER NOTE
Patient: Jaxson Chaidez    Procedure: Procedure(s):  COLONOSCOPY, WITH POLYPECTOMY AND BIOPSY       Diagnosis: Screening for malignant neoplasm of colon [Z12.11]  History of colonic polyps [Z86.010]  Diagnosis Additional Information: No value filed.    Anesthesia Type:   General     Note:    Oropharynx: oropharynx clear of all foreign objects and spontaneously breathing  Level of Consciousness: awake  Oxygen Supplementation: room air    Independent Airway: airway patency satisfactory and stable  Dentition: dentition unchanged  Vital Signs Stable: post-procedure vital signs reviewed and stable  Report to RN Given: handoff report given  Patient transferred to: Phase II    Handoff Report: Identifed the Patient, Identified the Reponsible Provider, Reviewed the pertinent medical history, Discussed the surgical course, Reviewed Intra-OP anesthesia mangement and issues during anesthesia, Set expectations for post-procedure period and Allowed opportunity for questions and acknowledgement of understanding      Vitals:  Vitals Value Taken Time   BP     Temp     Pulse     Resp     SpO2         Electronically Signed By: HOWARD Shukla CRNA  April 24, 2023  11:04 AM

## 2023-04-24 NOTE — ANESTHESIA POSTPROCEDURE EVALUATION
Patient: Jaxson Chaidez    Procedure: Procedure(s):  COLONOSCOPY, WITH POLYPECTOMY AND BIOPSY       Anesthesia Type:  General    Note:  Disposition: Outpatient   Postop Pain Control: Uneventful            Sign Out: Well controlled pain   PONV: No   Neuro/Psych: Uneventful            Sign Out: Acceptable/Baseline neuro status   Airway/Respiratory: Uneventful            Sign Out: Acceptable/Baseline resp. status   CV/Hemodynamics: Uneventful            Sign Out: Acceptable CV status; No obvious hypovolemia; No obvious fluid overload   Other NRE: NONE   DID A NON-ROUTINE EVENT OCCUR? No           Last vitals:  Vitals:    04/24/23 0958   Pulse: 74   Resp: 20   Temp: 36.8  C (98.2  F)       Electronically Signed By: HOWARD Shukla CRNA  April 24, 2023  11:06 AM

## 2023-04-24 NOTE — PROGRESS NOTES
[facility] DISCHARGE NOTE    Patient discharged to home at 11:42 AM via ambulation. Accompanied by spouse and staff. Discharge instructions reviewed with patient and spouse, opportunity offered to ask questions. Prescriptions - None ordered for discharge. All belongings sent with patient.    Jannet Spears RN

## 2023-04-24 NOTE — LETTER
Jaxson Chaidez  [address] [place] [address]      May 2, 2023    Dear Jaxson,  This letter is written to inform you of the results of your recent colonoscopy.  Your examination showed polyp(s) in your descending colon. All polyps were removed in their entirety and sent for review by a pathologist. As you will see on the pathology report below, the tissue(s) were tubular adenomatous polyps. Your examination also showed a lymphoid aggregate.    Final Diagnosis   A.  Colon, ascending: Polypectomy:  - Nondysplastic colonic mucosa with prominent lymphoid aggregate      B.  Colon, descending: Polypectomy:  - Tubular adenoma  - No evidence of high-grade dysplasia or invasive malignancy       Adenomatous polyps are entirely benign (non-cancerous); however, patients who have developed these polyps are at an increased risk for developing additional polyps in the future. If these are not eventually removed, there is a risk of developing colon cancer. We will advise more frequent examinations with you because of the risk associated with this type of polyp.    Given your age, in absence of symptoms this is your last colonoscopy.    Please remember that this recommendation is made with the understanding that you are not experiencing persistent changes in bowel function, bleeding per rectum, and/or significant abdominal pain. If you experience these symptoms, please contact your primary care provider for a further evaluation.     If you have any questions or concerns about the results of your colonoscopy or the appropriate follow-up, please contact my assistant at [phone]    Take care!    Sincerely,      Patel Walls,    [facility]  ___

## 2023-04-24 NOTE — H&P
83 year old year old male here for colonoscopy for personal history of polyps.  Last colonoscopy was 2018.  Patient denies any changes in stool caliber or blood in stool. History of colon cancer in a brother.    Patient Active Problem List   Diagnosis     Hearing loss     Cervicalgia     CARDIOVASCULAR SCREENING; LDL GOAL LESS THAN 160     Advanced directives, counseling/discussion     Malignant neoplasm of prostate (H)     Status post total left knee replacement     Left knee DJD     Senile nuclear sclerosis     Prostate cancer (H)       Past Medical History:   Diagnosis Date     Arthritis      Prostate cancer (H)        Past Surgical History:   Procedure Laterality Date     ARTHROPLASTY KNEE Left 2/25/2015    Procedure: ARTHROPLASTY KNEE;  Surgeon: Emery Lincoln MD;  Location: [facility]     BIOPSY      Prostate     COLONOSCOPY       DAVINCI LYMPHADENECTOMY Bilateral 7/24/2015    Procedure: DAVINCI LYMPHADENECTOMY;  Surgeon: Rk Stephenson MD;  Location:  OR     DAVINCI PROSTATECTOMY N/A 7/24/2015    Procedure: DAVINCI PROSTATECTOMY;  Surgeon: Rk Stephenson MD;  Location:  OR     ENT SURGERY Right 2007    cochlear implant     ESOPHAGOSCOPY, GASTROSCOPY, DUODENOSCOPY (EGD), COMBINED N/A 3/14/2022    Procedure: ESOPHAGOGASTRODUODENOSCOPY, WITH BIOPSY;  Surgeon: Angie Boggs MD;  Location: [facility]     GENITOURINARY SURGERY  2015    Prostate removal     INJECT EPIDURAL LUMBAR  3/1/2011    INJECT EPIDURAL LUMBAR performed by GENERIC ANESTHESIA PROVIDER at [facility]     PHACOEMULSIFICATION WITH STANDARD INTRAOCULAR LENS IMPLANT Left 5/21/2015    Procedure: PHACOEMULSIFICATION WITH STANDARD INTRAOCULAR LENS IMPLANT;  Surgeon: Romero Funk MD;  Location: [facility]     SURGICAL HISTORY OF -   01/21/2002    Colonoscopy       Family History   Problem Relation Age of Onset     Alzheimer Disease Mother      Diabetes Father      Diabetes Brother      Prostate Cancer Brother 75     Colon Cancer  "Brother      Other Cancer Brother         rectum, lung,brain     Prostate Cancer Brother      Hypertension Brother        Current Outpatient Rx   Medication Sig Dispense Refill     bisacodyl (DULCOLAX) 5 MG EC tablet Take 2 tablets at 3 pm the day before your procedure. If your procedure is before 11 am, take 2 additional tablets at 11 pm. If your procedure is after 11 am, take 2 additional tablets at 6 am. For additional instructions refer to your colonoscopy prep instructions. 4 tablet 0     polyethylene glycol (GOLYTELY) 236 g suspension The night before the exam at 6 pm drink an 8-ounce glass every 15 minutes until the jug is half empty. If you arrive before 11 AM: Drink the other half of the Golytely jug at 11 PM night before procedure. If you arrive after 11 AM: Drink the other half of the Golytely jug at 6 AM day of procedure. For additional instructions refer to your colonoscopy prep instructions. 4000 mL 0       No Known Allergies    Pt reports that he has never smoked. He has never used smokeless tobacco. He reports that he does not currently use alcohol. He reports that he does not use drugs.    Exam:  Pulse 74   Temp 98.2  F (36.8  C) (Oral)   Resp 20   Ht 1.721 m (5' 7.75\")   Wt 98 kg (216 lb)   BMI 33.09 kg/m      Awake, Alert OX3  Lungs - CTA bilaterally  CV - RRR, no murmurs, distal pulses intact  Abd - soft, non-distended, non-tender, +BS  Extr - No cyanosis or edema    A/P 83 year old year old male in need of colonoscopy for personal history of polyps. Risks, benefits, alternatives, and complications were discussed including the possibility of perforation, bleeding, missed lesion and the patient agreed to proceed.    Patel Walls, DO on 4/24/2023 at 10:38 AM      "

## 2023-04-25 LAB
PATH REPORT.COMMENTS IMP SPEC: NORMAL
PATH REPORT.COMMENTS IMP SPEC: NORMAL
PATH REPORT.FINAL DX SPEC: NORMAL
PATH REPORT.GROSS SPEC: NORMAL
PATH REPORT.MICROSCOPIC SPEC OTHER STN: NORMAL
PATH REPORT.RELEVANT HX SPEC: NORMAL
PHOTO IMAGE: NORMAL

## 2023-04-25 PROCEDURE — 88305 TISSUE EXAM BY PATHOLOGIST: CPT | Mod: 26 | Performed by: PATHOLOGY

## 2023-04-26 ENCOUNTER — OFFICE VISIT (OUTPATIENT)
Dept: FAMILY MEDICINE | Facility: CLINIC | Age: 83
End: 2023-04-26
Payer: COMMERCIAL

## 2023-04-26 VITALS
HEART RATE: 70 BPM | TEMPERATURE: 98 F | DIASTOLIC BLOOD PRESSURE: 68 MMHG | OXYGEN SATURATION: 96 % | HEIGHT: 67 IN | RESPIRATION RATE: 20 BRPM | BODY MASS INDEX: 34.18 KG/M2 | WEIGHT: 217.8 LBS | SYSTOLIC BLOOD PRESSURE: 136 MMHG

## 2023-04-26 DIAGNOSIS — Z23 NEED FOR SHINGLES VACCINE: ICD-10-CM

## 2023-04-26 DIAGNOSIS — M62.08 DIASTASIS RECTI: Primary | ICD-10-CM

## 2023-04-26 PROCEDURE — 99213 OFFICE O/P EST LOW 20 MIN: CPT | Performed by: FAMILY MEDICINE

## 2023-04-26 ASSESSMENT — PAIN SCALES - GENERAL: PAINLEVEL: NO PAIN (0)

## 2023-04-26 NOTE — PROGRESS NOTES
Assessment & Plan     Diastasis recti  No acute abdomen.  Since patient reports increased size and intermittent mild pain now, referred to gen surg for consult for definitive management.  Return precautions discussed and given to patient.  Activity as tolerated for now.  - Adult General Surg Referral    Need for shingles vaccine  Patient to check with insurance about coverage    Patient Instructions   A referral to general surgery has been placed.  A  will contact you in the next few business days.     If with severe pain, difficulty making bowel movements, vomiting, or fever, see a provider promptly.      Luis Samuel MD  [facility] is a 83 year old, presenting for the following health issues:  Hernia (Hernia check ) and Health Maintenance (Declined flu shot , will get shingrix at a pharmacy )        4/26/2023     2:39 PM   Additional Questions   Roomed by Romaine DAMIAN CMA   Accompanied by self     HPI     Concern - Upper abdominal bulge  Abdominal Pain on Left side Abdomen   Onset: hernia x 2-3 years , noted the bulge 2 weeks ago when doing sit ups.  Description: hernia right side abdomen and pain on left side  Intensity: mild  Progression of Symptoms:  same  Accompanying Signs & Symptoms: none; denies urinary or bowel movement changes; denies abdominal or groin pain right now but has had intermittent pain before  Previous history of similar problem: none   Precipitating factors:        Worsened by: sit ups  - hernia protrudes   Alleviating factors:        Improved by: none   Therapies tried and outcome: None        Review of Systems   Constitutional, HEENT, cardiovascular, pulmonary, GI, , musculoskeletal, neuro, skin, endocrine and psych systems are negative, except as otherwise noted.      Objective    /68 (BP Location: Left arm, Patient Position: Chair, Cuff Size: Adult Large)   Pulse 70   Temp 98  F (36.7  C) (Tympanic)   Resp 20   Ht 1.702 m  "(5' 7\")   Wt 98.8 kg (217 lb 12.8 oz)   SpO2 96%   BMI 34.11 kg/m    Body mass index is 34.11 kg/m .  Physical Exam   GENERAL: obese, ambulatory w/o assist , alert and no distress  EYES: no icterus  RESP: lungs clear to auscultation - no rales, no rhonchi, no wheezes  CV: regular rates and rhythm, normal S1 S2, no S3 or S4 and no murmur, no click or rub  ABD: rounded abdomen, no skin changes, no TTP, moderate sized ovoid midline epigastric reducible mass, no guarding, no Fried's sign, no palpable organomegaly  MS: extremities- no gross deformities noted, no edema  SKIN: good turgor, no jaundice or rash    No results found for any visits on 04/26/23.          "

## 2023-04-26 NOTE — PATIENT INSTRUCTIONS
A referral to general surgery has been placed.  A  will contact you in the next few business days.     If with severe pain, difficulty making bowel movements, vomiting, or fever, see a provider promptly.

## 2023-05-12 ENCOUNTER — OFFICE VISIT (OUTPATIENT)
Dept: SURGERY | Facility: CLINIC | Age: 83
End: 2023-05-12
Attending: FAMILY MEDICINE
Payer: COMMERCIAL

## 2023-05-12 VITALS
SYSTOLIC BLOOD PRESSURE: 145 MMHG | WEIGHT: 215.2 LBS | HEART RATE: 75 BPM | TEMPERATURE: 98.5 F | DIASTOLIC BLOOD PRESSURE: 73 MMHG | BODY MASS INDEX: 33.71 KG/M2

## 2023-05-12 DIAGNOSIS — M62.08 DIASTASIS RECTI: Primary | ICD-10-CM

## 2023-05-12 DIAGNOSIS — K42.9 UMBILICAL HERNIA WITHOUT OBSTRUCTION AND WITHOUT GANGRENE: ICD-10-CM

## 2023-05-12 PROCEDURE — 99203 OFFICE O/P NEW LOW 30 MIN: CPT | Performed by: SURGERY

## 2023-05-12 NOTE — PROGRESS NOTES
"  Assessment & Plan   Problem List Items Addressed This Visit        Musculoskeletal and Integumentary    Diastasis recti - Primary   Other Visit Diagnoses     Umbilical hernia without obstruction and without gangrene        BMI 33.0-33.9,adult             83-year-old male with a 4 fingerbreadth diastases supraumbilically.  Also has an umbilical hernia  -Umbilical hernia is completely asymptomatic and is incarcerated with preperitoneal fat.  I do not recommend repairing this unless it becomes symptomatic.  We will just do watchful waiting for now.  -As for the diastases, this is completely benign.  Reviewed the pathophysiology of diastases rectus.  Has no hernias there.  Repairing it at his age has much more risks than benefits.  -All of his questions were answered.  -Is to follow-up with me as needed.    Review of the result(s) of each unique test - previous CT of chest and CT pelvis  40 minutes spent by me on the date of the encounter doing chart review, patient visit and documentation        BMI:   Estimated body mass index is 33.71 kg/m  as calculated from the following:    Height as of 4/26/23: 1.702 m (5' 7\").    Weight as of this encounter: 97.6 kg (215 lb 3.2 oz).       No follow-ups on file.    [facility]-Kaitlynn Boggs MD  [facility] KARLI Orosco is a 83 year old, presenting for the following health issues:  Consult    Diastasis rectus for many years  More protrubance noted in the past few months  Somewhat discomfort but no actual pain.  Does have an umbilical hernia.  Stated this has not changed for many years.  No issues with eating, passing gas, having bowel movements  History of robotic prostatectomy a few years ago.  No hernias noted on his abdomen except for the incarcerated umbilical  Never heart attack.  Never stroke.         Review of Systems   Constitutional, HEENT, cardiovascular, pulmonary, GI, , musculoskeletal, neuro, skin, endocrine and psych systems are negative, except " as otherwise noted.      Objective    BP (!) 145/73   Pulse 75   Temp 98.5  F (36.9  C) (Tympanic)   Wt 97.6 kg (215 lb 3.2 oz)   BMI 33.71 kg/m    Body mass index is 33.71 kg/m .  Physical Exam  Abdominal:

## 2023-05-12 NOTE — LETTER
"    5/12/2023         RE: Jaxson Chaidez  [address] [facility] [address]        Dear Colleague,    Thank you for referring your patient, Jaxson Chaidez, to the [facility]. Please see a copy of my visit note below.      Assessment & Plan   Problem List Items Addressed This Visit        Musculoskeletal and Integumentary    Diastasis recti - Primary   Other Visit Diagnoses     Umbilical hernia without obstruction and without gangrene        BMI 33.0-33.9,adult             83-year-old male with a 4 fingerbreadth diastases supraumbilically.  Also has an umbilical hernia  -Umbilical hernia is completely asymptomatic and is incarcerated with preperitoneal fat.  I do not recommend repairing this unless it becomes symptomatic.  We will just do watchful waiting for now.  -As for the diastases, this is completely benign.  Reviewed the pathophysiology of diastases rectus.  Has no hernias there.  Repairing it at his age has much more risks than benefits.  -All of his questions were answered.  -Is to follow-up with me as needed.    Review of the result(s) of each unique test - previous CT of chest and CT pelvis  40 minutes spent by me on the date of the encounter doing chart review, patient visit and documentation        BMI:   Estimated body mass index is 33.71 kg/m  as calculated from the following:    Height as of 4/26/23: 1.702 m (5' 7\").    Weight as of this encounter: 97.6 kg (215 lb 3.2 oz).       No follow-ups on file.    Angie Boggs MD  [facility]    Susanna Orosco is a 83 year old, presenting for the following health issues:  Consult    Diastasis rectus for many years  More protrubance noted in the past few months  Somewhat discomfort but no actual pain.  Does have an umbilical hernia.  Stated this has not changed for many years.  No issues with eating, passing gas, having bowel movements  History of robotic prostatectomy a few years ago.  No hernias " noted on his abdomen except for the incarcerated umbilical  Never heart attack.  Never stroke.         Review of Systems   Constitutional, HEENT, cardiovascular, pulmonary, GI, , musculoskeletal, neuro, skin, endocrine and psych systems are negative, except as otherwise noted.     Objective    BP (!) 145/73   Pulse 75   Temp 98.5  F (36.9  C) (Tympanic)   Wt 97.6 kg (215 lb 3.2 oz)   BMI 33.71 kg/m    Body mass index is 33.71 kg/m .  Physical Exam  Abdominal:                        Again, thank you for allowing me to participate in the care of your patient.        Sincerely,        Angie Boggs MD

## 2023-05-12 NOTE — NURSING NOTE
"Initial Temp 98.5  F (36.9  C) (Tympanic)   Wt 97.6 kg (215 lb 3.2 oz)   BMI 33.71 kg/m   Estimated body mass index is 33.71 kg/m  as calculated from the following:    Height as of 4/26/23: 1.702 m (5' 7\").    Weight as of this encounter: 97.6 kg (215 lb 3.2 oz). .    Raquel Lang LPN on 5/12/2023 at 1:45 PM    "

## 2023-06-28 ENCOUNTER — OFFICE VISIT (OUTPATIENT)
Dept: FAMILY MEDICINE | Facility: CLINIC | Age: 83
End: 2023-06-28
Payer: COMMERCIAL

## 2023-06-28 VITALS
TEMPERATURE: 98.2 F | SYSTOLIC BLOOD PRESSURE: 142 MMHG | RESPIRATION RATE: 24 BRPM | HEIGHT: 69 IN | HEART RATE: 65 BPM | BODY MASS INDEX: 31.22 KG/M2 | DIASTOLIC BLOOD PRESSURE: 72 MMHG | WEIGHT: 210.8 LBS | OXYGEN SATURATION: 95 %

## 2023-06-28 DIAGNOSIS — Z86.718 PERSONAL HISTORY OF DVT (DEEP VEIN THROMBOSIS): ICD-10-CM

## 2023-06-28 DIAGNOSIS — M17.12 PRIMARY OSTEOARTHRITIS OF LEFT KNEE: ICD-10-CM

## 2023-06-28 DIAGNOSIS — Z96.652 S/P TKR (TOTAL KNEE REPLACEMENT), LEFT: ICD-10-CM

## 2023-06-28 DIAGNOSIS — Z01.818 PREOP GENERAL PHYSICAL EXAM: Primary | ICD-10-CM

## 2023-06-28 DIAGNOSIS — I10 UNCONTROLLED HYPERTENSION: ICD-10-CM

## 2023-06-28 DIAGNOSIS — R79.89 ELEVATED SERUM CREATININE: ICD-10-CM

## 2023-06-28 LAB
ANION GAP SERPL CALCULATED.3IONS-SCNC: 9 MMOL/L (ref 7–15)
BUN SERPL-MCNC: 25.1 MG/DL (ref 8–23)
CALCIUM SERPL-MCNC: 9.3 MG/DL (ref 8.8–10.2)
CHLORIDE SERPL-SCNC: 105 MMOL/L (ref 98–107)
CREAT SERPL-MCNC: 1.3 MG/DL (ref 0.67–1.17)
DEPRECATED HCO3 PLAS-SCNC: 26 MMOL/L (ref 22–29)
GFR SERPL CREATININE-BSD FRML MDRD: 55 ML/MIN/1.73M2
GLUCOSE SERPL-MCNC: 102 MG/DL (ref 70–99)
HGB BLD-MCNC: 12.6 G/DL (ref 13.3–17.7)
POTASSIUM SERPL-SCNC: 4.2 MMOL/L (ref 3.4–5.3)
SODIUM SERPL-SCNC: 140 MMOL/L (ref 136–145)
TSH SERPL DL<=0.005 MIU/L-ACNC: 2.66 UIU/ML (ref 0.3–4.2)

## 2023-06-28 PROCEDURE — 36415 COLL VENOUS BLD VENIPUNCTURE: CPT | Performed by: FAMILY MEDICINE

## 2023-06-28 PROCEDURE — 99215 OFFICE O/P EST HI 40 MIN: CPT | Performed by: FAMILY MEDICINE

## 2023-06-28 PROCEDURE — 80048 BASIC METABOLIC PNL TOTAL CA: CPT | Performed by: FAMILY MEDICINE

## 2023-06-28 PROCEDURE — 84443 ASSAY THYROID STIM HORMONE: CPT | Performed by: FAMILY MEDICINE

## 2023-06-28 PROCEDURE — 85018 HEMOGLOBIN: CPT | Performed by: FAMILY MEDICINE

## 2023-06-28 PROCEDURE — 93000 ELECTROCARDIOGRAM COMPLETE: CPT | Performed by: FAMILY MEDICINE

## 2023-06-28 RX ORDER — LISINOPRIL 2.5 MG/1
2.5 TABLET ORAL DAILY
Qty: 90 TABLET | Refills: 0 | Status: SHIPPED | OUTPATIENT
Start: 2023-06-28 | End: 2023-07-05

## 2023-06-28 NOTE — PATIENT INSTRUCTIONS
Start lisinopril 2.5 mg daily for blood pressure management.  Nurse visit in 1 week to recheck blood pressure.  Observe low salt diet.  If you have persistent chest pain, persistent or worsening breathing difficulty, palpitation, lightheadedness or other concerning symptoms, you should be brought to the ER.   You will be contacted in 1-2 days for results of your lab tests.   Do not take Ibuprofen, Aspirin or Naproxen 1 week before surgery.  If you need to take something for pain, take Acetaminophen 325 mg orally 1-2 tabs every 4-6 hrs as needed for pain     You may take lisinopril on morning of surgery if they are scheduled to be taken then. Take only with a small sip of water.    Stop any over the counter supplements or vitamins you are taking 1 week before surgery.    For informational purposes only. Not to replace the advice of your health care provider. Copyright   2003, 2019 [employer]. All rights reserved. Clinically reviewed by Danielle Beauchamp MD. [employer] [id number] - REV 12/22.  Preparing for Your Surgery  Getting started  A nurse will call you to review your health history and instructions. They will give you an arrival time based on your scheduled surgery time. Please be ready to share:  Your doctor's clinic name and phone number  Your medical, surgical, and anesthesia history  A list of allergies and sensitivities  A list of medicines, including herbal treatments and over-the-counter drugs  Whether the patient has a legal guardian (ask how to send us the papers in advance)  Please tell us if you're pregnant--or if there's any chance you might be pregnant. Some surgeries may injure a fetus (unborn baby), so they require a pregnancy test. Surgeries that are safe for a fetus don't always need a test, and you can choose whether to have one.   If you have a child who's having surgery, please ask for a copy of Preparing for Your Child's Surgery.    Preparing for surgery  Within 10 to 30 days of  surgery: Have a pre-op exam (sometimes called an H&P, or History and Physical). This can be done at a clinic or pre-operative center.  If you're having a , you may not need this exam. Talk to your care team.  At your pre-op exam, talk to your care team about all medicines you take. If you need to stop any medicines before surgery, ask when to start taking them again.  We do this for your safety. Many medicines can make you bleed too much during surgery. Some change how well surgery (anesthesia) drugs work.  Call your insurance company to let them know you're having surgery. (If you don't have insurance, call [phone].)  Call your clinic if there's any change in your health. This includes signs of a cold or flu (sore throat, runny nose, cough, rash, fever). It also includes a scrape or scratch near the surgery site.  If you have questions on the day of surgery, call your hospital or surgery center.  Eating and drinking guidelines  For your safety: Unless your surgeon tells you otherwise, follow the guidelines below.  Eat and drink as usual until 8 hours before you arrive for surgery. After that, no food or milk.  Drink clear liquids until 2 hours before you arrive. These are liquids you can see through, like water, Gatorade, and Propel Water. They also include plain black coffee and tea (no cream or milk), candy, and breath mints. You can spit out gum when you arrive.  If you drink alcohol: Stop drinking it the night before surgery.  If your care team tells you to take medicine on the morning of surgery, it's okay to take it with a sip of water.  Preventing infection  Shower or bathe the night before and morning of your surgery. Follow the instructions your clinic gave you. (If no instructions, use regular soap.)  Don't shave or clip hair near your surgery site. We'll remove the hair if needed.  Don't smoke or vape the morning of surgery. You may chew nicotine gum up to 2 hours before surgery. A nicotine  patch is okay.  Note: Some surgeries require you to completely quit smoking and nicotine. Check with your surgeon.  Your care team will make every effort to keep you safe from infection. We will:  Clean our hands often with soap and water (or an alcohol-based hand rub).  Clean the skin at your surgery site with a special soap that kills germs.  Give you a special gown to keep you warm. (Cold raises the risk of infection.)  Wear special hair covers, masks, gowns and gloves during surgery.  Give antibiotic medicine, if prescribed. Not all surgeries need antibiotics.  What to bring on the day of surgery  Photo ID and insurance card  Copy of your health care directive, if you have one  Glasses and hearing aids (bring cases)  You can't wear contacts during surgery  Inhaler and eye drops, if you use them (tell us about these when you arrive)  CPAP machine or breathing device, if you use them  A few personal items, if spending the night  If you have . . .  A pacemaker, ICD (cardiac defibrillator) or other implant: Bring the ID card.  An implanted stimulator: Bring the remote control.  A legal guardian: Bring a copy of the certified (court-stamped) guardianship papers.  Please remove any jewelry, including body piercings. Leave jewelry and other valuables at home.  If you're going home the day of surgery  You must have a responsible adult drive you home. They should stay with you overnight as well.  If you don't have someone to stay with you, and you aren't safe to go home alone, we may keep you overnight. Insurance often won't pay for this.  After surgery  If it's hard to control your pain or you need more pain medicine, please call your surgeon's office.  Questions?   If you have any questions for your care team, list them here: _________________________________________________________________________________________________________________________________________________________________________  ____________________________________ ____________________________________ ____________________________________

## 2023-06-28 NOTE — PROGRESS NOTES
[facility]  [address] [address]  Phone: [phone]  Primary Provider: Malinda Samuel  Pre-op Performing Provider: MALINDA SAMUEL      PREOPERATIVE EVALUATION:  Today's date: 6/28/2023    Jaxson Chaidez is a 83 year old male who presents for a preoperative evaluation.      6/28/2023     7:38 AM   Additional Questions   Roomed by Jade WALTER   Accompanied by self         6/28/2023     7:38 AM   Patient Reported Additional Medications   Patient reports taking the following new medications none     Surgical Information:  Surgery/Procedure: revision total knee left  Surgery Location: [facility]  Surgeon: Sasha Anderson  Surgery Date: 7/12/23  Time of Surgery: TBD  Where patient plans to recover: At home with family  Fax number for surgical facility: [phone]    Assessment & Plan     The proposed surgical procedure is considered INTERMEDIATE risk.    Preop general physical exam  - PRIMARY CARE FOLLOW-UP SCHEDULING  - EKG 12-lead complete w/read - Clinics    Primary osteoarthritis of left knee    S/P TKR (total knee replacement), left    Personal history of DVT (deep vein thrombosis)  Routine thromboembolic prophylaxis perioperatively is advised.    Uncontrolled hypertension  Patient was advised BP uncontrolled today. Has had previous HBP in the last 3 months.  Start lisinopril.   Reinforced sodium restriction.  Exercise recommendations reviewed with patient.  Recheck with RN in 1 week.    - Basic metabolic panel  - Hemoglobin  - TSH with free T4 reflex  - EKG 12-lead complete w/read - Clinics  - lisinopril (ZESTRIL) 2.5 MG tablet  Dispense: 90 tablet; Refill: 0  - TSH with free T4 reflex  - Hemoglobin  - Basic metabolic panel              - No identified additional risk factors other than previously addressed    Antiplatelet or Anticoagulation Medication Instructions:   - Patient is on no antiplatelet or anticoagulation  medications.    Additional Medication Instructions:  Patient is to take all scheduled medications on the day of surgery  stop any otc vitamins or supplements 1 week before surgery.    RECOMMENDATION:  APPROVAL GIVEN to proceed with proposed procedure pending recheck of his BP next week.    Subjective       HPI related to upcoming procedure: Patient has left knee osteoarthritis causing chronic pain. Hence, planned surgery. Reviewed above with patient.         6/28/2023     7:37 AM   Preop Questions   1. Have you ever had a heart attack or stroke? No   2. Have you ever had surgery on your heart or blood vessels, such as a stent placement, a coronary artery bypass, or surgery on an artery in your head, neck, heart, or legs? No   3. Do you have chest pain with activity? No   4. Do you have a history of  heart failure? No   5. Do you currently have a cold, bronchitis or symptoms of other infection? No   6. Do you have a cough, shortness of breath, or wheezing? No   7. Do you or anyone in your family have previous history of blood clots? YES - patient had DVT in 2015 after a knee surgery and prostate surgery - was on warfarin for 6 months.   8. Do you or does anyone in your family have a serious bleeding problem such as prolonged bleeding following surgeries or cuts? No   9. Have you ever had problems with anemia or been told to take iron pills? No   10. Have you had any abnormal blood loss such as black, tarry or bloody stools? No   11. Have you ever had a blood transfusion? No   12. Are you willing to have a blood transfusion if it is medically needed before, during, or after your surgery? Yes   13. Have you or any of your relatives ever had problems with anesthesia? No   14. Do you have sleep apnea, excessive snoring or daytime drowsiness? No   15. Do you have any artifical heart valves or other implanted medical devices like a pacemaker, defibrillator, or continuous glucose monitor? No   16. Do you have artificial  joints? YES - left knee   17. Are you allergic to latex? No       Health Care Directive:  Patient has a Health Care Directive on file      Preoperative Review of :   reviewed - no record of controlled substances prescribed.      Status of Chronic Conditions:  See problem list for active medical problems.  Problems all longstanding and stable, except as noted/documented.  See ROS for pertinent symptoms related to these conditions.    Review of Systems  CONSTITUTIONAL: NEGATIVE for fever, chills, change in weight  INTEGUMENTARY/SKIN: NEGATIVE for worrisome rashes, moles or lesions  EYES: NEGATIVE for vision changes or irritation  ENT/MOUTH: NEGATIVE for ear, mouth and throat problems  RESP: NEGATIVE for significant cough or SOB  CV: NEGATIVE for chest pain, palpitations or peripheral edema  GI: NEGATIVE for nausea, abdominal pain, heartburn, or change in bowel habits  : NEGATIVE for frequency, dysuria, or hematuria  MUSCULOSKELETAL:POSITIVE  for arthralgias left knee pain  NEURO: NEGATIVE for weakness, dizziness or paresthesias  ENDOCRINE: NEGATIVE for temperature intolerance, skin/hair changes  HEME: NEGATIVE for bleeding problems  PSYCHIATRIC: NEGATIVE for changes in mood or affect    Patient Active Problem List    Diagnosis Date Noted     Diastasis recti 04/26/2023     Priority: Medium     Prostate cancer (H) 07/24/2015     Priority: Medium     Senile nuclear sclerosis 05/19/2015     Priority: Medium     Status post total left knee replacement 02/25/2015     Priority: Medium     Left knee DJD 02/25/2015     Priority: Medium     Malignant neoplasm of prostate (H) 01/28/2015     Priority: Medium     Advanced directives, counseling/discussion 05/10/2013     Priority: Medium     Advance Care Planning:   Receipt of ACP document:  Received: Health Care Directive which was witnessed or notarized on 9/19/11.  Document not previously scanned.  Validation form completed and sent with document to be scanned.  Code  Status needs to be updated to reflect choices in most recent ACP document. Orders placed.  Confirmed/documented designated decision maker(s). See permanent comments section of demographics in clinical tab. View document(s) and details by clicking on code status.   Added by Katie Torrez on 5/10/2013.             CARDIOVASCULAR SCREENING; LDL GOAL LESS THAN 160 10/31/2010     Priority: Medium     Cervicalgia 05/15/2006     Priority: Medium     Hearing loss      Priority: Medium     Problem list name updated by automated process. Provider to review        Past Medical History:   Diagnosis Date     Arthritis      Prostate cancer (H)      Past Surgical History:   Procedure Laterality Date     ARTHROPLASTY KNEE Left 2/25/2015    Procedure: ARTHROPLASTY KNEE;  Surgeon: Emery Lincoln MD;  Location: [facility]     BIOPSY      Prostate     COLONOSCOPY       COLONOSCOPY N/A 4/24/2023    Procedure: COLONOSCOPY, WITH POLYPECTOMY AND BIOPSY;  Surgeon: Patel Walls DO;  Location: [facility]     DAVINCI LYMPHADENECTOMY Bilateral 7/24/2015    Procedure: DAVINCI LYMPHADENECTOMY;  Surgeon: Rk Stephenson MD;  Location:  OR     DAVINCI PROSTATECTOMY N/A 7/24/2015    Procedure: DAVINCI PROSTATECTOMY;  Surgeon: Rk Stephenson MD;  Location:  OR     ENT SURGERY Right 2007    cochlear implant     ESOPHAGOSCOPY, GASTROSCOPY, DUODENOSCOPY (EGD), COMBINED N/A 3/14/2022    Procedure: ESOPHAGOGASTRODUODENOSCOPY, WITH BIOPSY;  Surgeon: Angie Boggs MD;  Location: [facility]     GENITOURINARY SURGERY  2015    Prostate removal     INJECT EPIDURAL LUMBAR  3/1/2011    INJECT EPIDURAL LUMBAR performed by GENERIC ANESTHESIA PROVIDER at [facility] OR     PHACOEMULSIFICATION WITH STANDARD INTRAOCULAR LENS IMPLANT Left 5/21/2015    Procedure: PHACOEMULSIFICATION WITH STANDARD INTRAOCULAR LENS IMPLANT;  Surgeon: Romero Funk MD;  Location: [facility] OR     SURGICAL HISTORY OF -   01/21/2002    Colonoscopy     Current Outpatient  Medications   Medication Sig Dispense Refill     Flaxseed, Linseed, (FLAX SEED OIL) 1000 MG capsule Take 1 capsule by mouth two times daily.       MULTIVITAL OR 1 tablet by mouth daily         No Known Allergies     Social History     Tobacco Use     Smoking status: Never     Smokeless tobacco: Never   Substance Use Topics     Alcohol use: Not Currently     Comment: Rarely     Family History   Problem Relation Age of Onset     Alzheimer Disease Mother      Diabetes Father      Diabetes Brother      Prostate Cancer Brother 75     Colon Cancer Brother      Other Cancer Brother         rectum, lung,brain     Prostate Cancer Brother      Hypertension Brother      History   Drug Use No         Objective     There were no vitals taken for this visit.    Physical Exam  GENERAL APPEARANCE: alert and no distress; ambulatory w/o assist  EYES: pink conj, no icterus, PERRL, EOMI  HENT: ear canals and TM's normal, nose and mouth without ulcers or lesions, oropharynx clear and oral mucous membranes moist  NECK: no adenopathy, no asymmetry, masses, or scars and thyroid normal to palpation  RESP: lungs clear to auscultation - no rales, rhonchi or wheezes  CV: regular rates and rhythm, normal S1 S2, no S3 or S4, no murmur, click or rub, no peripheral edema and peripheral pulses strong  ABDOMEN: soft, nontender, no hepatosplenomegaly, no masses and bowel sounds normal  MS: no musculoskeletal defects are noted and gait is age appropriate without ataxia  SKIN: good turgor, no rash/jaundice/ecchymosis  NEURO: Normal strength and tone, sensory exam grossly normal, mentation intact and speech normal     Recent Labs   Lab Test 03/28/23  1423 03/16/22  0741 03/01/22  0739   HGB 12.8*  --   --      --   --    NA  --  140 140   POTASSIUM  --  4.4 4.1   CR  --  1.16 1.35*        Diagnostics:  Recent Results (from the past 24 hour(s))   TSH with free T4 reflex    Collection Time: 06/28/23  8:44 AM   Result Value Ref Range    TSH 2.66  0.30 - 4.20 uIU/mL   Hemoglobin    Collection Time: 06/28/23  8:44 AM   Result Value Ref Range    Hemoglobin 12.6 (L) 13.3 - 17.7 g/dL   Basic metabolic panel    Collection Time: 06/28/23  8:44 AM   Result Value Ref Range    Sodium 140 136 - 145 mmol/L    Potassium 4.2 3.4 - 5.3 mmol/L    Chloride 105 98 - 107 mmol/L    Carbon Dioxide (CO2) 26 22 - 29 mmol/L    Anion Gap 9 7 - 15 mmol/L    Urea Nitrogen 25.1 (H) 8.0 - 23.0 mg/dL    Creatinine 1.30 (H) 0.67 - 1.17 mg/dL    Calcium 9.3 8.8 - 10.2 mg/dL    Glucose 102 (H) 70 - 99 mg/dL    GFR Estimate 55 (L) >60 mL/min/1.73m2      EKG required for new hypertension diagnosis and not completed in the last 90 days.   EKG: Normal Sinus Rhythm, normal axis, normal intervals, no acute ST/T changes c/w ischemia, no LVH by voltage criteria, unable to pull up previous tracings.    Revised Cardiac Risk Index (RCRI):  The patient has the following serious cardiovascular risks for perioperative complications:   - No serious cardiac risks = 0 points     RCRI Interpretation: 0 points: Class I (very low risk - 0.4% complication rate)           Signed Electronically by: Luis Samuel MD  Copy of this evaluation report is provided to requesting physician.

## 2023-07-05 ENCOUNTER — TELEPHONE (OUTPATIENT)
Dept: FAMILY MEDICINE | Facility: CLINIC | Age: 83
End: 2023-07-05

## 2023-07-05 ENCOUNTER — ALLIED HEALTH/NURSE VISIT (OUTPATIENT)
Dept: FAMILY MEDICINE | Facility: CLINIC | Age: 83
End: 2023-07-05
Payer: COMMERCIAL

## 2023-07-05 VITALS
RESPIRATION RATE: 16 BRPM | SYSTOLIC BLOOD PRESSURE: 138 MMHG | DIASTOLIC BLOOD PRESSURE: 86 MMHG | HEART RATE: 64 BPM | OXYGEN SATURATION: 98 %

## 2023-07-05 DIAGNOSIS — I10 UNCONTROLLED HYPERTENSION: Primary | ICD-10-CM

## 2023-07-05 DIAGNOSIS — I10 UNCONTROLLED HYPERTENSION: ICD-10-CM

## 2023-07-05 PROCEDURE — 99207 PR NO CHARGE NURSE ONLY: CPT

## 2023-07-05 RX ORDER — LISINOPRIL 2.5 MG/1
5 TABLET ORAL DAILY
Qty: 90 TABLET | Refills: 0
Start: 2023-07-05 | End: 2023-11-15

## 2023-07-05 NOTE — TELEPHONE ENCOUNTER
Jaxson Troyigley is a 83 year old year old patient who comes in today for a Blood Pressure check because of new medication.  6/28 started on lisinopril 2.5mg daily     Vital Signs as repeated by RN   140/86 p64  138/86 p64     Patient is taking medication as prescribed  Patient is tolerating medications well.  Patient is not monitoring Blood Pressure at home.   Current complaints: none  Disposition:  Routed to provider for review  Preferred pharmacy: [facility], [facility]

## 2023-07-05 NOTE — TELEPHONE ENCOUNTER
Wife is reached with message to increase lisinopril and RN/lab lori in 1-2 weeks.  Wife wants my chart message sent to them also.  Done. Jessica LUND RN

## 2023-07-05 NOTE — PROGRESS NOTES
Jaxson Chaidez is a 83 year old year old patient who comes in today for a Blood Pressure check because of new medication.  6/28 started on lisinopril 2.5mg daily    Vital Signs as repeated by RN   140/86 p64  138/86 p64    Patient is taking medication as prescribed  Patient is tolerating medications well.  Patient is not monitoring Blood Pressure at home.   Current complaints: none  Disposition:  Routed to provider for review  Preferred pharmacy: [facility], [facility]

## 2023-07-05 NOTE — TELEPHONE ENCOUNTER
Covering for primary/ordering provider:  BP not at goal, increase lisinopril to 5mg daily (take 2 tablets of what he currently has). RN BP check in 1-2 weeks with lab.  Kaylin Bangura, CNP

## 2023-07-11 ENCOUNTER — LAB (OUTPATIENT)
Dept: LAB | Facility: CLINIC | Age: 83
End: 2023-07-11
Payer: COMMERCIAL

## 2023-07-11 DIAGNOSIS — I10 UNCONTROLLED HYPERTENSION: ICD-10-CM

## 2023-07-11 LAB
ANION GAP SERPL CALCULATED.3IONS-SCNC: 7 MMOL/L (ref 7–15)
BUN SERPL-MCNC: 31.1 MG/DL (ref 8–23)
CALCIUM SERPL-MCNC: 9.4 MG/DL (ref 8.8–10.2)
CHLORIDE SERPL-SCNC: 104 MMOL/L (ref 98–107)
CREAT SERPL-MCNC: 1.35 MG/DL (ref 0.67–1.17)
DEPRECATED HCO3 PLAS-SCNC: 29 MMOL/L (ref 22–29)
GFR SERPL CREATININE-BSD FRML MDRD: 53 ML/MIN/1.73M2
GLUCOSE SERPL-MCNC: 89 MG/DL (ref 70–99)
POTASSIUM SERPL-SCNC: 4.9 MMOL/L (ref 3.4–5.3)
SODIUM SERPL-SCNC: 140 MMOL/L (ref 136–145)

## 2023-07-11 PROCEDURE — 36415 COLL VENOUS BLD VENIPUNCTURE: CPT

## 2023-07-11 PROCEDURE — 80048 BASIC METABOLIC PNL TOTAL CA: CPT

## 2023-07-26 ENCOUNTER — DOCUMENTATION ONLY (OUTPATIENT)
Dept: LAB | Facility: CLINIC | Age: 83
End: 2023-07-26
Payer: COMMERCIAL

## 2023-07-26 NOTE — PROGRESS NOTES
Patient is requesting PSA labs. Please order future labs for 7-27 appointment. Thanks, Livia HUMPHREYS

## 2023-07-27 ENCOUNTER — LAB (OUTPATIENT)
Dept: LAB | Facility: CLINIC | Age: 83
End: 2023-07-27
Payer: COMMERCIAL

## 2023-07-27 DIAGNOSIS — C61 PROSTATE CANCER (H): Primary | ICD-10-CM

## 2023-07-27 DIAGNOSIS — R79.89 ELEVATED SERUM CREATININE: ICD-10-CM

## 2023-07-27 DIAGNOSIS — C61 PROSTATE CANCER (H): ICD-10-CM

## 2023-07-27 LAB
CREAT SERPL-MCNC: 1.25 MG/DL (ref 0.67–1.17)
GFR SERPL CREATININE-BSD FRML MDRD: 58 ML/MIN/1.73M2
PSA SERPL DL<=0.01 NG/ML-MCNC: <0.01 NG/ML

## 2023-07-27 PROCEDURE — 82565 ASSAY OF CREATININE: CPT

## 2023-07-27 PROCEDURE — 84153 ASSAY OF PSA TOTAL: CPT

## 2023-07-27 PROCEDURE — 36415 COLL VENOUS BLD VENIPUNCTURE: CPT

## 2023-07-28 ENCOUNTER — TRANSFERRED RECORDS (OUTPATIENT)
Dept: HEALTH INFORMATION MANAGEMENT | Facility: CLINIC | Age: 83
End: 2023-07-28
Payer: COMMERCIAL

## 2023-09-08 ENCOUNTER — TRANSFERRED RECORDS (OUTPATIENT)
Dept: HEALTH INFORMATION MANAGEMENT | Facility: CLINIC | Age: 83
End: 2023-09-08
Payer: COMMERCIAL

## 2023-10-24 ENCOUNTER — TELEPHONE (OUTPATIENT)
Dept: FAMILY MEDICINE | Facility: CLINIC | Age: 83
End: 2023-10-24
Payer: COMMERCIAL

## 2023-10-24 NOTE — TELEPHONE ENCOUNTER
Please call patient to assist in scheduling RN BP recheck within the next 2-3 weeks since he stopped antihypertensive on his own and his last recorded BP has been borderline.

## 2023-10-24 NOTE — TELEPHONE ENCOUNTER
Call received from Jocelyne, pharmacist with [facility]. Says that she contacted this pt for an adherence consultation, and pt reported that he's no longer taking lisinopril. Jocelyne unsure how long ago he stopped taking it, but says that a 3 month supply was last billed on 6/28/23. Pt told her that he doesn't think he needs this medication, although she says he hasn't been monitoring BPs at home. No follow up needed with Jocelyne. Last documented BPs from 7/5/23 were 140/86 and 138/86. Writer left message for pt to return call to clinic. Routing to PCP for review/recommendation.    Juani Ledesma RN  [facility]

## 2023-10-27 ENCOUNTER — TRANSFERRED RECORDS (OUTPATIENT)
Dept: HEALTH INFORMATION MANAGEMENT | Facility: CLINIC | Age: 83
End: 2023-10-27
Payer: COMMERCIAL

## 2023-11-13 ENCOUNTER — TELEPHONE (OUTPATIENT)
Dept: FAMILY MEDICINE | Facility: CLINIC | Age: 83
End: 2023-11-13

## 2023-11-13 ENCOUNTER — ALLIED HEALTH/NURSE VISIT (OUTPATIENT)
Dept: FAMILY MEDICINE | Facility: CLINIC | Age: 83
End: 2023-11-13
Payer: COMMERCIAL

## 2023-11-13 VITALS — OXYGEN SATURATION: 98 % | SYSTOLIC BLOOD PRESSURE: 138 MMHG | DIASTOLIC BLOOD PRESSURE: 72 MMHG | HEART RATE: 72 BPM

## 2023-11-13 DIAGNOSIS — I10 HTN (HYPERTENSION): Primary | ICD-10-CM

## 2023-11-13 PROCEDURE — 99207 PR NO CHARGE NURSE ONLY: CPT

## 2023-11-13 NOTE — PROGRESS NOTES
Jaxson Chaidez is a 83 year old year old patient who comes in today for a Blood Pressure check because of medication change and ongoing blood pressure monitoring. He was prescribed lisinopril but has not been taking it since July.  Vital Signs as repeated by /72 p 78  Repeat 138/72 p 72  Patient is not taking medication as prescribed    Patient is not monitoring Blood Pressure at home.    Current complaints: none  Disposition:  patient instructed to follow up with provider, pt has wellness appt 12/6/23   Leesa Cruz RN on 11/13/2023 at 9:21 AM

## 2023-11-13 NOTE — TELEPHONE ENCOUNTER
Patient's blood pressures are still borderline, and he has chronic kidney disease.  I recommend he restart lisinopril 2.5 mg both to control blood pressure better and to help prevent or slow down progression of kidney disease.  Will recheck blood pressure on scheduled in-clinic visit.

## 2023-11-14 NOTE — TELEPHONE ENCOUNTER
Spoke with spouse who states patient is non compliant with taking his medication and is very [place] so he may not be understanding.     Relayed Dr. Samuel's detailed message below, spouse verbalized good understanding and states they have a wellness exam scheduled 12/6/23 which she plans to attend with him but wants to let Dr. Samuel know patient will not take it.    Update routed to provider as FYI. Julie Behrendt RN

## 2023-11-15 ENCOUNTER — MYC MEDICAL ADVICE (OUTPATIENT)
Dept: FAMILY MEDICINE | Facility: CLINIC | Age: 83
End: 2023-11-15
Payer: COMMERCIAL

## 2023-11-15 DIAGNOSIS — I10 UNCONTROLLED HYPERTENSION: ICD-10-CM

## 2023-11-15 RX ORDER — LISINOPRIL 2.5 MG/1
5 TABLET ORAL DAILY
Qty: 90 TABLET | Refills: 3 | Status: SHIPPED | OUTPATIENT
Start: 2023-11-15 | End: 2023-12-06

## 2023-12-06 ENCOUNTER — OFFICE VISIT (OUTPATIENT)
Dept: FAMILY MEDICINE | Facility: CLINIC | Age: 83
End: 2023-12-06
Payer: COMMERCIAL

## 2023-12-06 VITALS
OXYGEN SATURATION: 99 % | HEART RATE: 66 BPM | RESPIRATION RATE: 16 BRPM | BODY MASS INDEX: 30.75 KG/M2 | DIASTOLIC BLOOD PRESSURE: 66 MMHG | SYSTOLIC BLOOD PRESSURE: 136 MMHG | HEIGHT: 69 IN | WEIGHT: 207.6 LBS | TEMPERATURE: 97.7 F

## 2023-12-06 DIAGNOSIS — Z29.11 NEED FOR VACCINATION AGAINST RESPIRATORY SYNCYTIAL VIRUS: ICD-10-CM

## 2023-12-06 DIAGNOSIS — M47.814 OSTEOARTHRITIS OF THORACIC SPINE, UNSPECIFIED SPINAL OSTEOARTHRITIS COMPLICATION STATUS: ICD-10-CM

## 2023-12-06 DIAGNOSIS — M54.6 CHRONIC MIDLINE THORACIC BACK PAIN: ICD-10-CM

## 2023-12-06 DIAGNOSIS — E78.2 MIXED HYPERLIPIDEMIA: ICD-10-CM

## 2023-12-06 DIAGNOSIS — G89.29 CHRONIC MIDLINE THORACIC BACK PAIN: ICD-10-CM

## 2023-12-06 DIAGNOSIS — Z00.00 ENCOUNTER FOR MEDICARE ANNUAL WELLNESS EXAM: Primary | ICD-10-CM

## 2023-12-06 DIAGNOSIS — N18.31 CHRONIC KIDNEY DISEASE, STAGE 3A (H): ICD-10-CM

## 2023-12-06 DIAGNOSIS — I10 BENIGN ESSENTIAL HYPERTENSION: ICD-10-CM

## 2023-12-06 DIAGNOSIS — Z23 NEED FOR SHINGLES VACCINE: ICD-10-CM

## 2023-12-06 LAB
CHOLEST SERPL-MCNC: 263 MG/DL
FASTING STATUS PATIENT QL REPORTED: YES
HDLC SERPL-MCNC: 61 MG/DL
LDLC SERPL CALC-MCNC: 180 MG/DL
NONHDLC SERPL-MCNC: 202 MG/DL
TRIGL SERPL-MCNC: 112 MG/DL

## 2023-12-06 PROCEDURE — 99214 OFFICE O/P EST MOD 30 MIN: CPT | Mod: 25 | Performed by: FAMILY MEDICINE

## 2023-12-06 PROCEDURE — 80061 LIPID PANEL: CPT | Performed by: FAMILY MEDICINE

## 2023-12-06 PROCEDURE — 36415 COLL VENOUS BLD VENIPUNCTURE: CPT | Performed by: FAMILY MEDICINE

## 2023-12-06 PROCEDURE — G0439 PPPS, SUBSEQ VISIT: HCPCS | Performed by: FAMILY MEDICINE

## 2023-12-06 RX ORDER — LISINOPRIL 5 MG/1
5 TABLET ORAL DAILY
Qty: 90 TABLET | Refills: 3 | Status: SHIPPED | OUTPATIENT
Start: 2023-12-06

## 2023-12-06 RX ORDER — RESPIRATORY SYNCYTIAL VIRUS VACCINE 120MCG/0.5
0.5 KIT INTRAMUSCULAR ONCE
Qty: 1 EACH | Refills: 0 | Status: CANCELLED | OUTPATIENT
Start: 2023-12-06 | End: 2023-12-06

## 2023-12-06 ASSESSMENT — PAIN SCALES - GENERAL: PAINLEVEL: MILD PAIN (3)

## 2023-12-06 ASSESSMENT — ENCOUNTER SYMPTOMS
PALPITATIONS: 0
FREQUENCY: 0
JOINT SWELLING: 0
DIARRHEA: 0
MYALGIAS: 0
NERVOUS/ANXIOUS: 0
SORE THROAT: 0
DIZZINESS: 0
SHORTNESS OF BREATH: 0
ARTHRALGIAS: 0
FEVER: 0
HEMATURIA: 0
HEMATOCHEZIA: 0
CONSTIPATION: 0
WEAKNESS: 0
PARESTHESIAS: 0
HEARTBURN: 0
COUGH: 0
ABDOMINAL PAIN: 0
DYSURIA: 0
HEADACHES: 0
NAUSEA: 0
CHILLS: 0
EYE PAIN: 0

## 2023-12-06 ASSESSMENT — ACTIVITIES OF DAILY LIVING (ADL): CURRENT_FUNCTION: NO ASSISTANCE NEEDED
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
## 2023-12-06 NOTE — PATIENT INSTRUCTIONS
Keep lisinopril at 5 mg total per day.  New prescription sent to pharmacy for the next time you refill - romina diana for a 5 mg tablet already so you only take once a day.  Do not stop this medication.    You will be contacted in 1-2 business days to get a schedule for the spine specialist   Reconsider physical therapy. You declined this today.    Be consistent with low salt, low trans fat and low saturated fat diet.  Eat food rich in omega-3-fatty acids as you tolerate. (salmon, olive oil)  Eat 5 cups of vegetables, fruits and whole grains per day.  Limit starchy food (white rice, white bread, white pasta, white potatoes) to less than a cup per meal.  Minimize sweets, junk food and fastfood. Limit soda beverages to one serving per day; best to avoid it altogether though.    Exercise: moderate intensity sustained for at least 30 mins per episode, goal of 150 mins per week at least  Combine cardiovascular and resistance exercises.  These exercise recommendations are in addition to your daily activity at work or home.    You will be contacted in 1-2 days for results of your lab tests.     Get RSV vaccine at the pharmacy.  Complete your covid19 vaccine soon.  Reconsider getting yearly flu shots - start getting one now.  You may get the Shingrix vaccine for shingles if you desire, and after you verify with insurance how they cover the vaccine.     Preventative Care Visits include: Yearly physicals, Well-child visits, Welcome to Medicare visits, & Medicare yearly wellness exams.    The purpose of these visits is to discuss your medical history and prevent health problems before you are sick.  You may need to pay a copay, coinsurance or deductible if your visit today includes testing or treating for a new or existing condition.    Additional charges may be incurred for today's visit. If you have questions about what your insurance plan covers, please contact your Insurance Company's member service department.  If you  have questions specific to a bill you have already received from [employer], please contact the [facility] Billing Office at [phone].        Patient Education   Personalized Prevention Plan  You are due for the preventive services outlined below.  Your care team is available to assist you in scheduling these services.  If you have already completed any of these items, please share that information with your care team to update in your medical record.  Health Maintenance Due   Topic Date Due    RSV VACCINE (Pregnancy & 60+) (1 - 1-dose 60+ series) Never done    Zoster (Shingles) Vaccine (2 of 3) 04/03/2015    Flu Vaccine (1) Never done    COVID-19 Vaccine (5 - 2023-24 season) 09/01/2023     Preventive Health Recommendations  See your health care provider every year to  Review health changes.   Discuss preventive care.    Review your medicines if your doctor has prescribed any.  Talk with your health care provider about whether you should have a test to screen for prostate cancer (PSA).  Every 3 years, have a diabetes test (fasting glucose). If you are at risk for diabetes, you should have this test more often.  Every 5 years, have a cholesterol test. Have this test more often if you are at risk for high cholesterol or heart disease.   Every 10 years, have a colonoscopy. Or, have a yearly FIT test (stool test). These exams will check for colon cancer.  Talk to with your health care provider about screening for Abdominal Aortic Aneurysm if you have a family history of AAA or have a history of smoking.    Shots:   Get a flu shot each year.   Get a tetanus shot every 10 years.   Talk to your doctor about your pneumonia vaccines. There are now two you should receive - Pneumovax (PPSV 23) and Prevnar (PCV 13).  Talk to your pharmacist about a shingles vaccine.   Talk to your doctor about the hepatitis B vaccine.    Nutrition:   Eat at least 5 servings of fruits and vegetables each day.   Eat whole-grain bread,  whole-wheat pasta and brown rice instead of white grains and rice.   Get adequate Calcium and Vitamin D.     Lifestyle  Exercise for at least 150 minutes a week (30 minutes a day, 5 days a week). This will help you control your weight and prevent disease.   Limit alcohol to one drink per day.   No smoking.   Wear sunscreen to prevent skin cancer.   See your dentist every six months for an exam and cleaning.   See your eye doctor every 1 to 2 years to screen for conditions such as glaucoma, macular degeneration and cataracts.    Personalized Prevention Plan  You are due for the preventive services outlined below.  Your care team is available to assist you in scheduling these services.  If you have already completed any of these items, please share that information with your care team to update in your medical record.  Health Maintenance   Topic Date Due    RSV VACCINE (Pregnancy & 60+) (1 - 1-dose 60+ series) Never done    ZOSTER IMMUNIZATION (2 of 3) 04/03/2015    INFLUENZA VACCINE (1) Never done    COVID-19 Vaccine (5 - 2023-24 season) 09/01/2023    ANNUAL REVIEW OF HM ORDERS  03/01/2024    MEDICARE ANNUAL WELLNESS VISIT  12/06/2024    FALL RISK ASSESSMENT  12/06/2024    DTAP/TDAP/TD IMMUNIZATION (4 - Td or Tdap) 12/04/2027    ADVANCE CARE PLANNING  12/06/2028    PHQ-2 (once per calendar year)  Completed    Pneumococcal Vaccine: 65+ Years  Completed    IPV IMMUNIZATION  Aged Out    HPV IMMUNIZATION  Aged Out    MENINGITIS IMMUNIZATION  Aged Out    RSV MONOCLONAL ANTIBODY  Aged Out       Hearing Loss: Care Instructions  Overview     Hearing loss is a sudden or slow decrease in how well you hear. It can range from slight to profound. Permanent hearing loss can occur with aging. It also can happen when you are exposed long-term to loud noise. Examples include listening to loud music, riding motorcycles, or being around other loud machines.  Hearing loss can affect your work and home life. It can make you feel lonely  or depressed. You may feel that you have lost your independence. But hearing aids and other devices can help you hear better and feel connected to others.  Follow-up care is a key part of your treatment and safety. Be sure to make and go to all appointments, and call your doctor if you are having problems. It's also a good idea to know your test results and keep a list of the medicines you take.  How can you care for yourself at home?  Avoid loud noises whenever possible. This helps keep your hearing from getting worse.  Always wear hearing protection around loud noises.  Wear a hearing aid as directed.  A professional can help you pick a hearing aid that will work best for you.  You can also get hearing aids over the counter for mild to moderate hearing loss.  Have hearing tests as your doctor suggests. They can show whether your hearing has changed. Your hearing aid may need to be adjusted.  Use other devices as needed. These may include:  Telephone amplifiers and hearing aids that can connect to a television, stereo, radio, or microphone.  Devices that use lights or vibrations. These alert you to the doorbell, a ringing telephone, or a baby monitor.  Television closed-captioning. This shows the words at the bottom of the screen. Most new TVs can do this.  TTY (text telephone). This lets you type messages back and forth on the telephone instead of talking or listening. These devices are also called TDD. When messages are typed on the keyboard, they are sent over the phone line to a receiving TTY. The message is shown on a monitor.  Use text messaging, social media, and email if it is hard for you to communicate by telephone.  Try to learn a listening technique called speechreading. It is not lipreading. You pay attention to people's gestures, expressions, posture, and tone of voice. These clues can help you understand what a person is saying. Face the person you are talking to, and have them face you. Make sure  "the lighting is good. You need to see the other person's face clearly.  Think about counseling if you need help to adjust to your hearing loss.  When should you call for help?  Watch closely for changes in your health, and be sure to contact your doctor if:    You think your hearing is getting worse.     You have new symptoms, such as dizziness or nausea.   Where can you learn more?  Go to https://www.[employer].net/patiented  Enter R798 in the search box to learn more about \"Hearing Loss: Care Instructions.\"  Current as of: February 28, 2023               Content Version: 13.8    8977-0232 [employer].   Care instructions adapted under license by your healthcare professional. If you have questions about a medical condition or this instruction, always ask your healthcare professional. [employer] disclaims any warranty or liability for your use of this information.         "

## 2023-12-06 NOTE — PROGRESS NOTES
"The patient was provided with written information regarding signs of hearing loss.  Answers submitted by the patient for this visit:  Annual Preventive Visit (Submitted on 12/6/2023)  Chief Complaint: Annual Exam:  In general, how would you rate your overall physical health?: good  Frequency of exercise:: 6-7 days/week  Do you usually eat at least 4 servings of fruit and vegetables a day, include whole grains & fiber, and avoid regularly eating high fat or \"junk\" foods? : Yes  Taking medications regularly:: Yes  Medication side effects:: None  Activities of Daily Living: no assistance needed  Home safety: no safety concerns identified  Hearing Impairment:: difficulty understanding speech on the telephone  In the past 6 months, have you been bothered by leaking of urine?: No  abdominal pain: No  Blood in stool: No  Blood in urine: No  chest pain: No  chills: No  congestion: No  constipation: No  cough: No  diarrhea: No  dizziness: No  ear pain: No  eye pain: No  nervous/anxious: No  fever: No  frequency: No  genital sores: No  headaches: No  hearing loss: Yes  heartburn: No  arthralgias: No  joint swelling: No  peripheral edema: No  mood changes: No  myalgias: No  nausea: No  dysuria: No  palpitations: No  Skin sensation changes: No  sore throat: No  urgency: No  rash: No  shortness of breath: No  visual disturbance: Yes  weakness: No  impotence: No  penile discharge: No  In general, how would you rate your overall mental or emotional health?: good  Additional concerns today:: No  Exercise outside of work (Submitted on 12/6/2023)  Chief Complaint: Annual Exam:  Duration of exercise:: 45-60 minutes    "

## 2023-12-07 LAB
CREAT UR-MCNC: 152.7 MG/DL
MICROALBUMIN UR-MCNC: <12 MG/L
MICROALBUMIN/CREAT UR: NORMAL MG/G{CREAT}

## 2023-12-07 PROCEDURE — 82043 UR ALBUMIN QUANTITATIVE: CPT | Performed by: FAMILY MEDICINE

## 2023-12-07 PROCEDURE — 82570 ASSAY OF URINE CREATININE: CPT | Performed by: FAMILY MEDICINE

## 2024-01-09 ENCOUNTER — TRANSFERRED RECORDS (OUTPATIENT)
Dept: HEALTH INFORMATION MANAGEMENT | Facility: CLINIC | Age: 84
End: 2024-01-09
Payer: COMMERCIAL

## 2024-01-18 ENCOUNTER — TRANSFERRED RECORDS (OUTPATIENT)
Dept: HEALTH INFORMATION MANAGEMENT | Facility: CLINIC | Age: 84
End: 2024-01-18
Payer: COMMERCIAL

## 2024-02-21 ENCOUNTER — TRANSFERRED RECORDS (OUTPATIENT)
Dept: HEALTH INFORMATION MANAGEMENT | Facility: CLINIC | Age: 84
End: 2024-02-21

## 2024-06-12 NOTE — ANESTHESIA CARE TRANSFER NOTE
Patient: Jaxson Chaidez    Procedure: Procedure(s):  ESOPHAGOGASTRODUODENOSCOPY, WITH BIOPSY       Diagnosis: Abdominal bloating [R14.0]  Early satiety [R68.81]  Diagnosis Additional Information: No value filed.    Anesthesia Type:   General     Note:    Oropharynx: oropharynx clear of all foreign objects and spontaneously breathing  Level of Consciousness: awake      Independent Airway: airway patency satisfactory and stable  Dentition: dentition unchanged  Vital Signs Stable: post-procedure vital signs reviewed and stable  Report to RN Given: handoff report given  Patient transferred to: Phase II    Handoff Report: Identifed the Patient, Identified the Reponsible Provider, Reviewed the pertinent medical history, Discussed the surgical course, Reviewed Intra-OP anesthesia mangement and issues during anesthesia, Set expectations for post-procedure period and Allowed opportunity for questions and acknowledgement of understanding      Vitals:  Vitals Value Taken Time   BP     Temp     Pulse     Resp     SpO2         Electronically Signed By: HOWARD Shukla CRNA  March 14, 2022  8:59 AM   .

## 2024-06-24 ENCOUNTER — TRANSFERRED RECORDS (OUTPATIENT)
Dept: HEALTH INFORMATION MANAGEMENT | Facility: CLINIC | Age: 84
End: 2024-06-24
Payer: COMMERCIAL

## 2024-07-26 ENCOUNTER — MYC MEDICAL ADVICE (OUTPATIENT)
Dept: FAMILY MEDICINE | Facility: CLINIC | Age: 84
End: 2024-07-26

## 2024-07-26 ENCOUNTER — DOCUMENTATION ONLY (OUTPATIENT)
Dept: UROLOGY | Facility: CLINIC | Age: 84
End: 2024-07-26

## 2024-07-26 DIAGNOSIS — C61 PROSTATE CANCER (H): Primary | ICD-10-CM

## 2024-07-26 NOTE — TELEPHONE ENCOUNTER
Dr. Samuel,    Please see [employer] message below and advise. Last OV 12/06/23.    Thank you  Chinmay WOODS RN  [facility]

## 2024-07-26 NOTE — PROGRESS NOTES
Jaxson Chaidez has an upcoming lab appointment:    Future Appointments   Date Time Provider Department Center   7/26/2024  1:30 PM [facility]   12/16/2024  9:00 AM Luis Samuel MD [facility]     Patient is scheduled for the following lab(s): Yearly PSA    There is no order available. Please review and place either future orders or HMPO (Review of Health Maintenance Protocol Orders), as appropriate.    Health Maintenance Due   Topic    ANNUAL REVIEW OF HM ORDERS     BMP     HEMOGLOBIN      Kacy Magaña

## 2024-08-02 ENCOUNTER — LAB (OUTPATIENT)
Dept: LAB | Facility: CLINIC | Age: 84
End: 2024-08-02
Payer: COMMERCIAL

## 2024-08-02 DIAGNOSIS — C61 PROSTATE CANCER (H): ICD-10-CM

## 2024-08-02 DIAGNOSIS — E78.2 MIXED HYPERLIPIDEMIA: ICD-10-CM

## 2024-08-02 LAB
CHOLEST SERPL-MCNC: 232 MG/DL
FASTING STATUS PATIENT QL REPORTED: ABNORMAL
HDLC SERPL-MCNC: 55 MG/DL
LDLC SERPL CALC-MCNC: 148 MG/DL
NONHDLC SERPL-MCNC: 177 MG/DL
PSA SERPL DL<=0.01 NG/ML-MCNC: <0.01 NG/ML
TRIGL SERPL-MCNC: 145 MG/DL

## 2024-08-02 PROCEDURE — 84153 ASSAY OF PSA TOTAL: CPT

## 2024-08-02 PROCEDURE — 80061 LIPID PANEL: CPT

## 2024-08-02 PROCEDURE — 36415 COLL VENOUS BLD VENIPUNCTURE: CPT

## 2024-10-07 ENCOUNTER — TRANSFERRED RECORDS (OUTPATIENT)
Dept: HEALTH INFORMATION MANAGEMENT | Facility: CLINIC | Age: 84
End: 2024-10-07
Payer: COMMERCIAL

## 2024-11-11 ENCOUNTER — TRANSFERRED RECORDS (OUTPATIENT)
Dept: HEALTH INFORMATION MANAGEMENT | Facility: CLINIC | Age: 84
End: 2024-11-11
Payer: COMMERCIAL

## 2024-12-16 ENCOUNTER — OFFICE VISIT (OUTPATIENT)
Dept: FAMILY MEDICINE | Facility: CLINIC | Age: 84
End: 2024-12-16
Attending: FAMILY MEDICINE
Payer: COMMERCIAL

## 2024-12-16 VITALS
BODY MASS INDEX: 31.98 KG/M2 | HEIGHT: 68 IN | HEART RATE: 71 BPM | SYSTOLIC BLOOD PRESSURE: 132 MMHG | OXYGEN SATURATION: 98 % | WEIGHT: 211 LBS | RESPIRATION RATE: 16 BRPM | TEMPERATURE: 97.5 F | DIASTOLIC BLOOD PRESSURE: 70 MMHG

## 2024-12-16 DIAGNOSIS — E78.2 MIXED HYPERLIPIDEMIA: ICD-10-CM

## 2024-12-16 DIAGNOSIS — Z00.00 ENCOUNTER FOR MEDICARE ANNUAL WELLNESS EXAM: Primary | ICD-10-CM

## 2024-12-16 DIAGNOSIS — Z29.11 NEED FOR VACCINATION AGAINST RESPIRATORY SYNCYTIAL VIRUS: ICD-10-CM

## 2024-12-16 DIAGNOSIS — N18.31 CHRONIC KIDNEY DISEASE, STAGE 3A (H): ICD-10-CM

## 2024-12-16 DIAGNOSIS — Z23 NEED FOR SHINGLES VACCINE: ICD-10-CM

## 2024-12-16 DIAGNOSIS — I10 BENIGN ESSENTIAL HYPERTENSION: ICD-10-CM

## 2024-12-16 LAB
ANION GAP SERPL CALCULATED.3IONS-SCNC: 8 MMOL/L (ref 7–15)
BUN SERPL-MCNC: 22.3 MG/DL (ref 8–23)
CALCIUM SERPL-MCNC: 9.3 MG/DL (ref 8.8–10.4)
CHLORIDE SERPL-SCNC: 102 MMOL/L (ref 98–107)
CREAT SERPL-MCNC: 1.14 MG/DL (ref 0.67–1.17)
CREAT UR-MCNC: 124.5 MG/DL
EGFRCR SERPLBLD CKD-EPI 2021: 64 ML/MIN/1.73M2
GLUCOSE SERPL-MCNC: 103 MG/DL (ref 70–99)
HCO3 SERPL-SCNC: 30 MMOL/L (ref 22–29)
HGB BLD-MCNC: 13.8 G/DL (ref 13.3–17.7)
MICROALBUMIN UR-MCNC: <12 MG/L
MICROALBUMIN/CREAT UR: NORMAL MG/G{CREAT}
POTASSIUM SERPL-SCNC: 4.6 MMOL/L (ref 3.4–5.3)
SODIUM SERPL-SCNC: 140 MMOL/L (ref 135–145)

## 2024-12-16 PROCEDURE — 85018 HEMOGLOBIN: CPT | Performed by: FAMILY MEDICINE

## 2024-12-16 PROCEDURE — 99214 OFFICE O/P EST MOD 30 MIN: CPT | Mod: 25 | Performed by: FAMILY MEDICINE

## 2024-12-16 PROCEDURE — 82043 UR ALBUMIN QUANTITATIVE: CPT | Performed by: FAMILY MEDICINE

## 2024-12-16 PROCEDURE — 80048 BASIC METABOLIC PNL TOTAL CA: CPT | Performed by: FAMILY MEDICINE

## 2024-12-16 PROCEDURE — 82570 ASSAY OF URINE CREATININE: CPT | Performed by: FAMILY MEDICINE

## 2024-12-16 PROCEDURE — 36415 COLL VENOUS BLD VENIPUNCTURE: CPT | Performed by: FAMILY MEDICINE

## 2024-12-16 PROCEDURE — G0439 PPPS, SUBSEQ VISIT: HCPCS | Performed by: FAMILY MEDICINE

## 2024-12-16 RX ORDER — LISINOPRIL 5 MG/1
5 TABLET ORAL DAILY
Qty: 90 TABLET | Refills: 3 | Status: SHIPPED | OUTPATIENT
Start: 2024-12-16

## 2024-12-16 SDOH — HEALTH STABILITY: PHYSICAL HEALTH: ON AVERAGE, HOW MANY MINUTES DO YOU ENGAGE IN EXERCISE AT THIS LEVEL?: 30 MIN

## 2024-12-16 SDOH — HEALTH STABILITY: PHYSICAL HEALTH: ON AVERAGE, HOW MANY DAYS PER WEEK DO YOU ENGAGE IN MODERATE TO STRENUOUS EXERCISE (LIKE A BRISK WALK)?: 5 DAYS

## 2024-12-16 ASSESSMENT — SOCIAL DETERMINANTS OF HEALTH (SDOH): HOW OFTEN DO YOU GET TOGETHER WITH FRIENDS OR RELATIVES?: ONCE A WEEK

## 2024-12-16 ASSESSMENT — PAIN SCALES - GENERAL: PAINLEVEL_OUTOF10: NO PAIN (0)

## 2024-12-16 NOTE — PATIENT INSTRUCTIONS
Patient Education     Get covid19, flu and RSV vaccines at the pharmacy [facility]!  Get your second shingles vaccine    Ask the pharmacy to send your vaccination record to the clinic when completed.    You will be contacted in 1-2 days for results of your lab tests.     Be consistent with low salt, low trans fat and low saturated fat diet.  Eat food rich in omega-3-fatty acids as you tolerate. (salmon, olive oil)  Eat 5 cups of vegetables, fruits and whole grains per day.  Limit starchy food (white rice, white bread, white pasta, white potatoes) to less than a cup per meal.  Minimize sweets, junk food and fastfood. Limit soda beverages to one serving per day; best to avoid it altogether though.    Exercise: moderate intensity sustained for at least 30 mins per episode, goal of 150 mins per week at least  Combine cardiovascular and resistance exercises.  These exercise recommendations are in addition to your daily activity at work or home.  Work on losing weight.    Preventative Care Visits include: Yearly physicals, Well-child visits, Welcome to Medicare visits, & Medicare yearly wellness exams.    The purpose of these visits is to discuss your medical history and prevent health problems before you are sick.  You may need to pay a copay, coinsurance or deductible if your visit today includes testing or treating for a new or existing condition.    Additional charges may be incurred for today's visit. If you have questions about what your insurance plan covers, please contact your Insurance Company's member service department.  If you have questions specific to a bill you have already received from [employer], please contact the [facility] Billing Office at [phone].    Preventive Care Advice   This is general advice given by our system to help you stay healthy. However, your care team may have specific advice just for you. Please talk to your care team about your preventive care needs.  Nutrition  Eat 5 or more servings  of fruits and vegetables each day.  Try wheat bread, brown rice and whole grain pasta (instead of white bread, rice, and pasta).  Get enough calcium and vitamin D. Check the label on foods and aim for 100% of the RDA (recommended daily allowance).  Lifestyle  Exercise at least 150 minutes each week  (30 minutes a day, 5 days a week).  Do muscle strengthening activities 2 days a week. These help control your weight and prevent disease.  No smoking.  Wear sunscreen to prevent skin cancer.  Have a dental exam and cleaning every 6 months.  Yearly exams  See your health care team every year to talk about:  Any changes in your health.  Any medicines your care team has prescribed.  Preventive care, family planning, and ways to prevent chronic diseases.  Shots (vaccines)   HPV shots (up to age 26), if you've never had them before.  Hepatitis B shots (up to age 59), if you've never had them before.  COVID-19 shot: Get this shot when it's due.  Flu shot: Get a flu shot every year.  Tetanus shot: Get a tetanus shot every 10 years.  Pneumococcal, hepatitis A, and RSV shots: Ask your care team if you need these based on your risk.  Shingles shot (for age 50 and up)  General health tests  Diabetes screening:  Starting at age 35, Get screened for diabetes at least every 3 years.  If you are younger than age 35, ask your care team if you should be screened for diabetes.  Cholesterol test: At age 39, start having a cholesterol test every 5 years, or more often if advised.  Bone density scan (DEXA): At age 50, ask your care team if you should have this scan for osteoporosis (brittle bones).  Hepatitis C: Get tested at least once in your life.  STIs (sexually transmitted infections)  Before age 24: Ask your care team if you should be screened for STIs.  After age 24: Get screened for STIs if you're at risk. You are at risk for STIs (including HIV) if:  You are sexually active with more than one person.  You don't use condoms every  time.  You or a partner was diagnosed with a sexually transmitted infection.  If you are at risk for HIV, ask about PrEP medicine to prevent HIV.  Get tested for HIV at least once in your life, whether you are at risk for HIV or not.  Cancer screening tests  Cervical cancer screening: If you have a cervix, begin getting regular cervical cancer screening tests starting at age 21.  Breast cancer scan (mammogram): If you've ever had breasts, begin having regular mammograms starting at age 40. This is a scan to check for breast cancer.  Colon cancer screening: It is important to start screening for colon cancer at age 45.  Have a colonoscopy test every 10 years (or more often if you're at risk) Or, ask your provider about stool tests like a FIT test every year or Cologuard test every 3 years.  To learn more about your testing options, visit:   .  For help making a decision, visit:   https://bit.ly/gj48268.  Prostate cancer screening test: If you have a prostate, ask your care team if a prostate cancer screening test (PSA) at age 55 is right for you.  Lung cancer screening: If you are a current or former smoker ages 50 to 80, ask your care team if ongoing lung cancer screenings are right for you.  For informational purposes only. Not to replace the advice of your health care provider. Copyright   2023 [facility]. All rights reserved. Clinically reviewed by the [facility]itions [facility]. [employer] 495909 - REV 01/24.  Hearing Loss: Care Instructions  Overview     Hearing loss is a sudden or slow decrease in how well you hear. It can range from slight to profound. Permanent hearing loss can occur with aging. It also can happen when you are exposed long-term to loud noise. Examples include listening to loud music, riding motorcycles, or being around other loud machines.  Hearing loss can affect your work and home life. It can make you feel lonely or depressed. You may feel that you have lost your  independence. But hearing aids and other devices can help you hear better and feel connected to others.  Follow-up care is a key part of your treatment and safety. Be sure to make and go to all appointments, and call your doctor if you are having problems. It's also a good idea to know your test results and keep a list of the medicines you take.  How can you care for yourself at home?  Avoid loud noises whenever possible. This helps keep your hearing from getting worse.  Always wear hearing protection around loud noises.  Wear a hearing aid as directed.  A professional can help you pick a hearing aid that will work best for you.  You can also get hearing aids over the counter for mild to moderate hearing loss.  Have hearing tests as your doctor suggests. They can show whether your hearing has changed. Your hearing aid may need to be adjusted.  Use other devices as needed. These may include:  Telephone amplifiers and hearing aids that can connect to a television, stereo, radio, or microphone.  Devices that use lights or vibrations. These alert you to the doorbell, a ringing telephone, or a baby monitor.  Television closed-captioning. This shows the words at the bottom of the screen. Most new TVs can do this.  TTY (text telephone). This lets you type messages back and forth on the telephone instead of talking or listening. These devices are also called TDD. When messages are typed on the keyboard, they are sent over the phone line to a receiving TTY. The message is shown on a monitor.  Use text messaging, social media, and email if it is hard for you to communicate by telephone.  Try to learn a listening technique called speechreading. It is not lipreading. You pay attention to people's gestures, expressions, posture, and tone of voice. These clues can help you understand what a person is saying. Face the person you are talking to, and have them face you. Make sure the lighting is good. You need to see the other  "person's face clearly.  Think about counseling if you need help to adjust to your hearing loss.  When should you call for help?  Watch closely for changes in your health, and be sure to contact your doctor if:    You think your hearing is getting worse.     You have new symptoms, such as dizziness or nausea.   Where can you learn more?  Go to https://www.[employer].net/patiented  Enter R798 in the search box to learn more about \"Hearing Loss: Care Instructions.\"  Current as of: September 27, 2023  Content Version: 14.2 2024 [facility].   Care instructions adapted under license by your healthcare professional. If you have questions about a medical condition or this instruction, always ask your healthcare professional. [employer] disclaims any warranty or liability for your use of this information.       "

## 2024-12-16 NOTE — PROGRESS NOTES
"Preventive Care Visit  [facility]  Luis Samuel MD, Family Medicine  Dec 16, 2024      Assessment & Plan     Encounter for Medicare annual wellness exam  Patient was advised on recommended screening and preventive health recommendations.  He verbalized understanding and agreed to the plans below.   He still declines flu vaccine in spite of adequate advice.    Benign essential hypertension  Controlled.  Low salt, low fat diet.   Exercise as tolerated.  Take meds as prescribed; call if with side effects.    - lisinopril (ZESTRIL) 5 MG tablet  Dispense: 90 tablet; Refill: 3  - OFFICE/OUTPT VISIT,ESTLEVL III    Chronic kidney disease, stage 3a (H)  Stable renal function on last labs. Repeat today.  Maintain hydration. Avoid nephrotoxins.   - BASIC METABOLIC PANEL  - Albumin Random Urine Quantitative with Creat Ratio  - Hemoglobin  - Hemoglobin  - Albumin Random Urine Quantitative with Creat Ratio  - BASIC METABOLIC PANEL    Mixed hyperlipidemia  Improved in lab 4 months ago with no meds.  Reinforced heart healthy lifestyle.     Need for shingles vaccine  Patient bryan get second dose at pharmacy.    Need for vaccination against respiratory syncytial virus  Patient will get vaccine from pharmacy for better insurance coverage.       Patient has been advised of split billing requirements and indicates understanding: Yes        BMI  Estimated body mass index is 32.08 kg/m  as calculated from the following:    Height as of this encounter: 1.727 m (5' 8\").    Weight as of this encounter: 95.7 kg (211 lb).   Weight management plan: Discussed healthy diet and exercise guidelines    Counseling  Appropriate preventive services were addressed with this patient via screening, questionnaire, or discussion as appropriate for fall prevention, nutrition, physical activity, Tobacco-use cessation, social engagement, weight loss and cognition.  Checklist reviewing preventive services available has been " given to the patient.  Reviewed patient's diet, addressing concerns and/or questions.   The patient was provided with written information regarding signs of hearing loss.       Patient Instructions   Patient Education    Get covid19, flu and RSV vaccines at the pharmacy ASAP!  Get your second shingles vaccine    Ask the pharmacy to send your vaccination record to the clinic when completed.    You will be contacted in 1-2 days for results of your lab tests.     Be consistent with low salt, low trans fat and low saturated fat diet.  Eat food rich in omega-3-fatty acids as you tolerate. (salmon, olive oil)  Eat 5 cups of vegetables, fruits and whole grains per day.  Limit starchy food (white rice, white bread, white pasta, white potatoes) to less than a cup per meal.  Minimize sweets, junk food and fastfood. Limit soda beverages to one serving per day; best to avoid it altogether though.    Exercise: moderate intensity sustained for at least 30 mins per episode, goal of 150 mins per week at least  Combine cardiovascular and resistance exercises.  These exercise recommendations are in addition to your daily activity at work or home.  Work on losing weight.    Preventative Care Visits include: Yearly physicals, Well-child visits, Welcome to Medicare visits, & Medicare yearly wellness exams.    The purpose of these visits is to discuss your medical history and prevent health problems before you are sick.  You may need to pay a copay, coinsurance or deductible if your visit today includes testing or treating for a new or existing condition.    Additional charges may be incurred for today's visit. If you have questions about what your insurance plan covers, please contact your Insurance Company's member service department.  If you have questions specific to a bill you have already received from [email or web address], please contact the Corporate Billing Office at [phone].    Preventive Care Advice   This is general advice given by  our system to help you stay healthy. However, your care team may have specific advice just for you. Please talk to your care team about your preventive care needs.  Nutrition  Eat 5 or more servings of fruits and vegetables each day.  Try wheat bread, brown rice and whole grain pasta (instead of white bread, rice, and pasta).  Get enough calcium and vitamin D. Check the label on foods and aim for 100% of the RDA (recommended daily allowance).  Lifestyle  Exercise at least 150 minutes each week  (30 minutes a day, 5 days a week).  Do muscle strengthening activities 2 days a week. These help control your weight and prevent disease.  No smoking.  Wear sunscreen to prevent skin cancer.  Have a dental exam and cleaning every 6 months.  Yearly exams  See your health care team every year to talk about:  Any changes in your health.  Any medicines your care team has prescribed.  Preventive care, family planning, and ways to prevent chronic diseases.  Shots (vaccines)   HPV shots (up to age 26), if you've never had them before.  Hepatitis B shots (up to age 59), if you've never had them before.  COVID-19 shot: Get this shot when it's due.  Flu shot: Get a flu shot every year.  Tetanus shot: Get a tetanus shot every 10 years.  Pneumococcal, hepatitis A, and RSV shots: Ask your care team if you need these based on your risk.  Shingles shot (for age 50 and up)  General health tests  Diabetes screening:  Starting at age 35, Get screened for diabetes at least every 3 years.  If you are younger than age 35, ask your care team if you should be screened for diabetes.  Cholesterol test: At age 39, start having a cholesterol test every 5 years, or more often if advised.  Bone density scan (DEXA): At age 50, ask your care team if you should have this scan for osteoporosis (brittle bones).  Hepatitis C: Get tested at least once in your life.  STIs (sexually transmitted infections)  Before age 24: Ask your care team if you should be  screened for STIs.  After age 24: Get screened for STIs if you're at risk. You are at risk for STIs (including HIV) if:  You are sexually active with more than one person.  You don't use condoms every time.  You or a partner was diagnosed with a sexually transmitted infection.  If you are at risk for HIV, ask about PrEP medicine to prevent HIV.  Get tested for HIV at least once in your life, whether you are at risk for HIV or not.  Cancer screening tests  Cervical cancer screening: If you have a cervix, begin getting regular cervical cancer screening tests starting at age 21.  Breast cancer scan (mammogram): If you've ever had breasts, begin having regular mammograms starting at age 40. This is a scan to check for breast cancer.  Colon cancer screening: It is important to start screening for colon cancer at age 45.  Have a colonoscopy test every 10 years (or more often if you're at risk) Or, ask your provider about stool tests like a FIT test every year or Cologuard test every 3 years.  To learn more about your testing options, visit:   .  For help making a decision, visit:   https://bit.ly/va25802.  Prostate cancer screening test: If you have a prostate, ask your care team if a prostate cancer screening test (PSA) at age 55 is right for you.  Lung cancer screening: If you are a current or former smoker ages 50 to 80, ask your care team if ongoing lung cancer screenings are right for you.  For informational purposes only. Not to replace the advice of your health care provider. Copyright   2023 [facility]. All rights reserved. Clinically reviewed by the [facility]. [employer] 791083 - REV 01/24.  Hearing Loss: Care Instructions  Overview     Hearing loss is a sudden or slow decrease in how well you hear. It can range from slight to profound. Permanent hearing loss can occur with aging. It also can happen when you are exposed long-term to loud noise. Examples include listening  to loud music, riding motorcycles, or being around other loud machines.  Hearing loss can affect your work and home life. It can make you feel lonely or depressed. You may feel that you have lost your independence. But hearing aids and other devices can help you hear better and feel connected to others.  Follow-up care is a key part of your treatment and safety. Be sure to make and go to all appointments, and call your doctor if you are having problems. It's also a good idea to know your test results and keep a list of the medicines you take.  How can you care for yourself at home?  Avoid loud noises whenever possible. This helps keep your hearing from getting worse.  Always wear hearing protection around loud noises.  Wear a hearing aid as directed.  A professional can help you pick a hearing aid that will work best for you.  You can also get hearing aids over the counter for mild to moderate hearing loss.  Have hearing tests as your doctor suggests. They can show whether your hearing has changed. Your hearing aid may need to be adjusted.  Use other devices as needed. These may include:  Telephone amplifiers and hearing aids that can connect to a television, stereo, radio, or microphone.  Devices that use lights or vibrations. These alert you to the doorbell, a ringing telephone, or a baby monitor.  Television closed-captioning. This shows the words at the bottom of the screen. Most new TVs can do this.  TTY (text telephone). This lets you type messages back and forth on the telephone instead of talking or listening. These devices are also called TDD. When messages are typed on the keyboard, they are sent over the phone line to a receiving TTY. The message is shown on a monitor.  Use text messaging, social media, and email if it is hard for you to communicate by telephone.  Try to learn a listening technique called speechreading. It is not lipreading. You pay attention to people's gestures, expressions, posture,  "and tone of voice. These clues can help you understand what a person is saying. Face the person you are talking to, and have them face you. Make sure the lighting is good. You need to see the other person's face clearly.  Think about counseling if you need help to adjust to your hearing loss.  When should you call for help?  Watch closely for changes in your health, and be sure to contact your doctor if:    You think your hearing is getting worse.     You have new symptoms, such as dizziness or nausea.   Where can you learn more?  Go to https://www.[employer]/patiented  Enter R798 in the search box to learn more about \"Hearing Loss: Care Instructions.\"  Current as of: September 27, 2023  Content Version: 14.2 2024 [facility] [employer].   Care instructions adapted under license by your healthcare professional. If you have questions about a medical condition or this instruction, always ask your healthcare professional. [employer], [employer] disclaims any warranty or liability for your use of this information.         Susanna Orosco is a 84 year old, presenting for the following:  Physical and Hypertension        12/16/2024     8:50 AM   Additional Questions   Roomed by Paris REYNOLDS MA   Accompanied by self         12/16/2024     8:50 AM   Patient Reported Additional Medications   Patient reports taking the following new medications none          HPI      Hypertension Follow-up    Do you check your blood pressure regularly outside of the clinic? No   Are you following a low salt diet? Yes  Are your blood pressures ever more than 140 on the top number (systolic) OR more   than 90 on the bottom number (diastolic), for example 140/90? N/A    Hyperlipidemia Follow-Up    Are you regularly taking any medication or supplement to lower your cholesterol?   No  Are you having muscle aches or other side effects that you think could be caused by your cholesterol lowering medication?  N/a  Last measurement 4 months ago " showed improved but still slightly elevated numbers compared to last measurement prior to that.    Denies chest pain, dyspnea, HA, BOV, dizziness or urinary changes.     Health Care Directive  Patient has a Health Care Directive on file  Advance care planning document is on file and is current.      12/16/2024   General Health   How would you rate your overall physical health? Good   Feel stress (tense, anxious, or unable to sleep) Not at all            12/16/2024   Nutrition   Diet: Regular (no restrictions)            12/16/2024   Exercise   Days per week of moderate/strenous exercise 5 days   Average minutes spent exercising at this level 30 min            12/16/2024   Social Factors   Frequency of gathering with friends or relatives Once a week   Worry food won't last until get money to buy more No   Food not last or not have enough money for food? No   Do you have housing? (Housing is defined as stable permanent housing and does not include staying ouside in a car, in a tent, in an abandoned building, in an overnight shelter, or couch-surfing.) Yes   Are you worried about losing your housing? No   Lack of transportation? No   Unable to get utilities (heat,electricity)? No            12/16/2024   Fall Risk   Fallen 2 or more times in the past year? No    Trouble with walking or balance? No        Patient-reported          12/16/2024   Activities of Daily Living- Home Safety   Needs help with the following daily activites None of the above   Safety concerns in the home None of the above            12/16/2024   Dental   Dentist two times every year? Yes            12/16/2024   Hearing Screening   Hearing concerns? (!) I NEED TO ASK PEOPLE TO SPEAK UP OR REPEAT THEMSELVES.    (!) IT'S HARD TO FOLLOW A CONVERSATION IN A NOISY RESTAURANT OR CROWDED ROOM.    (!) TROUBLE UNDERSTANDING SPEECH ON THE TELEPHONE       Multiple values from one day are sorted in reverse-chronological order         12/16/2024   Driving Risk  Screening   Patient/family members have concerns about driving No            12/16/2024   General Alertness/Fatigue Screening   Have you been more tired than usual lately? No            12/16/2024   Urinary Incontinence Screening   Bothered by leaking urine in past 6 months No            12/16/2024   TB Screening   Were you born outside of the [place]? No            Today's PHQ-2 Score:       12/16/2024     8:50 AM   PHQ-2 ( 1999 [employer])   PHQ-2 Score Incomplete           12/16/2024   Substance Use   Alcohol more than 3/day or more than 7/wk No   Do you have a current opioid prescription? No   How severe/bad is pain from 1 to 10? 0/10 (No Pain)   Do you use any other substances recreationally? No        Social History     Tobacco Use    Smoking status: Never    Smokeless tobacco: Never   Vaping Use    Vaping status: Never Used   Substance Use Topics    Alcohol use: Not Currently     Comment: Rarely    Drug use: No             Reviewed and updated as needed this visit by Provider                    Past Medical History:   Diagnosis Date    Arthritis     Prostate cancer (H)      Past Surgical History:   Procedure Laterality Date    ARTHROPLASTY KNEE Left 2/25/2015    Procedure: ARTHROPLASTY KNEE;  Surgeon: Emery Lincoln MD;  Location: [facility]    BIOPSY      Prostate    COLONOSCOPY      COLONOSCOPY N/A 4/24/2023    Procedure: COLONOSCOPY, WITH POLYPECTOMY AND BIOPSY;  Surgeon: Paetl Walls DO;  Location: [facility]    DAVINCI LYMPHADENECTOMY Bilateral 7/24/2015    Procedure: DAVINCI LYMPHADENECTOMY;  Surgeon: Rk Stephenson MD;  [facility]    DAVINCI PROSTATECTOMY N/A 7/24/2015    Procedure: DAVINCI PROSTATECTOMY;  Surgeon: Rk Stephenson MD;  [facility]    ENT SURGERY Right 2007    cochlear implant    ESOPHAGOSCOPY, GASTROSCOPY, DUODENOSCOPY (EGD), COMBINED N/A 3/14/2022    Procedure: ESOPHAGOGASTRODUODENOSCOPY, WITH BIOPSY;  Surgeon: Angie Boggs MD;  Location: [facility]     GENITOURINARY SURGERY  2015    Prostate removal    INJECT EPIDURAL LUMBAR  3/1/2011    INJECT EPIDURAL LUMBAR performed by GENERIC ANESTHESIA PROVIDER at [facility]    PHACOEMULSIFICATION WITH STANDARD INTRAOCULAR LENS IMPLANT Left 5/21/2015    Procedure: PHACOEMULSIFICATION WITH STANDARD INTRAOCULAR LENS IMPLANT;  Surgeon: Romero Funk MD;  Location: [facility]    SURGICAL HISTORY OF -   01/21/2002    Colonoscopy     Patient Active Problem List   Diagnosis    Hearing loss    Cervicalgia    CARDIOVASCULAR SCREENING; LDL GOAL LESS THAN 160    Status post total left knee replacement    Left knee DJD    Senile nuclear sclerosis    Diastasis recti    Chronic kidney disease, stage 3a (H)     Past Surgical History:   Procedure Laterality Date    ARTHROPLASTY KNEE Left 2/25/2015    Procedure: ARTHROPLASTY KNEE;  Surgeon: Emery Lincoln MD;  Location: [facility]    BIOPSY      Prostate    COLONOSCOPY      COLONOSCOPY N/A 4/24/2023    Procedure: COLONOSCOPY, WITH POLYPECTOMY AND BIOPSY;  Surgeon: Patel Walls DO;  Location: [facility]    DAVINCI LYMPHADENECTOMY Bilateral 7/24/2015    Procedure: DAVINCI LYMPHADENECTOMY;  Surgeon: Rk Stephenson MD;  [facility]    DAVINCI PROSTATECTOMY N/A 7/24/2015    Procedure: DAVINCI PROSTATECTOMY;  Surgeon: Rk Stephenson MD;  [facility]    ENT SURGERY Right 2007    cochlear implant    ESOPHAGOSCOPY, GASTROSCOPY, DUODENOSCOPY (EGD), COMBINED N/A 3/14/2022    Procedure: ESOPHAGOGASTRODUODENOSCOPY, WITH BIOPSY;  Surgeon: Angie Boggs MD;  Location: [facility]    GENITOURINARY SURGERY  2015    Prostate removal    INJECT EPIDURAL LUMBAR  3/1/2011    INJECT EPIDURAL LUMBAR performed by GENERIC ANESTHESIA PROVIDER at [facility]    PHACOEMULSIFICATION WITH STANDARD INTRAOCULAR LENS IMPLANT Left 5/21/2015    Procedure: PHACOEMULSIFICATION WITH STANDARD INTRAOCULAR LENS IMPLANT;  Surgeon: Romero Funk MD;  Location: [facility]    SURGICAL HISTORY OF -   01/21/2002     Colonoscopy       Social History     Tobacco Use    Smoking status: Never    Smokeless tobacco: Never   Substance Use Topics    Alcohol use: Not Currently     Comment: Rarely     Family History   Problem Relation Age of Onset    Alzheimer Disease Mother     Diabetes Father     Diabetes Brother     Prostate Cancer Brother 75    Colon Cancer Brother     Other Cancer Brother         rectum, lung,brain    Prostate Cancer Brother     Hypertension Brother          Current Outpatient Medications   Medication Sig Dispense Refill    Flaxseed, Linseed, (FLAX SEED OIL) 1000 MG capsule Take 1 capsule by mouth two times daily.      lisinopril (ZESTRIL) 5 MG tablet Take 1 tablet (5 mg) by mouth daily Hold until patient calls for refill. 90 tablet 3    MULTIVITAL OR 1 tablet by mouth daily       No Known Allergies  Current providers sharing in care for this patient include:  Patient Care Team:  Luis Samuel MD as PCP - General (Family Medicine)  Rk Stephenson MD as MD (Urology)  LUCIANO Bal MD as Referring Physician (Family Practice)  Rk Estes MD as MD (Urology)  Luis Samuel MD as Assigned PCP    The following health maintenance items are reviewed in Epic and correct as of today:  Health Maintenance   Topic Date Due    RSV VACCINE (1 - 1-dose 75+ series) Never done    PHQ-2 (once per calendar year)  01/01/2024    ANNUAL REVIEW OF HM ORDERS  03/01/2024    BMP  07/11/2024    INFLUENZA VACCINE (1) Never done    COVID-19 Vaccine (6 - 2024-25 season) 09/01/2024    MICROALBUMIN  12/07/2024    ZOSTER IMMUNIZATION (3 of 3) 02/05/2024    HEMOGLOBIN  06/28/2024    MEDICARE ANNUAL WELLNESS VISIT  12/06/2024    LIPID  08/02/2025    FALL RISK ASSESSMENT  12/16/2025    DTAP/TDAP/TD IMMUNIZATION (4 - Td or Tdap) 12/04/2027    ADVANCE CARE PLANNING  12/16/2029    Pneumococcal Vaccine: 65+ Years  Completed    URINALYSIS  Completed    HPV IMMUNIZATION  Aged Out    MENINGITIS  "IMMUNIZATION  Aged Out    RSV MONOCLONAL ANTIBODY  Aged Out         Review of Systems  Constitutional, HEENT, cardiovascular, pulmonary, GI, , musculoskeletal, neuro, skin, endocrine and psych systems are negative, except as otherwise noted.     Objective    Exam  BP (!) 144/72 (BP Location: Right arm, Patient Position: Sitting, Cuff Size: Adult Regular)   Pulse 71   Temp 97.5  F (36.4  C) (Tympanic)   Resp 16   Ht 1.727 m (5' 8\")   Wt 95.7 kg (211 lb)   SpO2 98%   BMI 32.08 kg/m     Estimated body mass index is 32.08 kg/m  as calculated from the following:    Height as of this encounter: 1.727 m (5' 8\").    Weight as of this encounter: 95.7 kg (211 lb).    Physical Exam  GENERAL APPEARANCE: obese, ambulatory w/o assist, alert and no distress  EYES: pink conj, no icterus, PERRL, EOMI  HENT: ear canals and TM's normal, nose and mouth without ulcers or lesions, oropharynx clear and oral mucous membranes moist  NECK: no adenopathy, no asymmetry, masses, or scars and thyroid normal to palpation  RESP: lungs clear to auscultation - no rales, rhonchi or wheezes  CV: regular rates and rhythm, normal S1 S2, no S3 or S4, no murmur, click or rub, no peripheral edema and peripheral pulses strong  ABDOMEN: soft, nontender, no hepatosplenomegaly, no masses and bowel sounds normal  RECTAL: deferred  MS: no musculoskeletal defects are noted and gait is age appropriate without ataxia  SKIN: no suspicious lesions or rashes  NEURO: Normal strength and tone, sensory exam grossly normal, mentation intact and speech normal         12/16/2024   Mini Cog   Clock Draw Score 2 Normal   3 Item Recall 1 object recalled   Mini Cog Total Score 3            Vision Screen  Reason Vision Screen Not Completed: Screening Recommend: Patient/Guardian Declined (Pt had a vision exam one month ago approximately 11/15/24 [facility].)      Signed Electronically by: Luis Samuel MD    "

## 2025-04-28 ENCOUNTER — OFFICE VISIT (OUTPATIENT)
Dept: FAMILY MEDICINE | Facility: CLINIC | Age: 85
End: 2025-04-28
Payer: MEDICARE

## 2025-04-28 VITALS
HEART RATE: 69 BPM | SYSTOLIC BLOOD PRESSURE: 130 MMHG | HEIGHT: 68 IN | OXYGEN SATURATION: 98 % | RESPIRATION RATE: 18 BRPM | DIASTOLIC BLOOD PRESSURE: 60 MMHG | BODY MASS INDEX: 31.83 KG/M2 | TEMPERATURE: 98.3 F | WEIGHT: 210 LBS

## 2025-04-28 DIAGNOSIS — S29.012A STRAIN OF RHOMBOID MUSCLE, INITIAL ENCOUNTER: ICD-10-CM

## 2025-04-28 DIAGNOSIS — S46.819D STRAIN OF TRAPEZIUS MUSCLE, UNSPECIFIED LATERALITY, SUBSEQUENT ENCOUNTER: Primary | ICD-10-CM

## 2025-04-28 DIAGNOSIS — Z87.828 HISTORY OF MOTOR VEHICLE ACCIDENT: ICD-10-CM

## 2025-04-28 PROCEDURE — 99213 OFFICE O/P EST LOW 20 MIN: CPT | Performed by: FAMILY MEDICINE

## 2025-04-28 PROCEDURE — 3078F DIAST BP <80 MM HG: CPT | Performed by: FAMILY MEDICINE

## 2025-04-28 PROCEDURE — 3075F SYST BP GE 130 - 139MM HG: CPT | Performed by: FAMILY MEDICINE

## 2025-04-28 PROCEDURE — 1125F AMNT PAIN NOTED PAIN PRSNT: CPT | Performed by: FAMILY MEDICINE

## 2025-04-28 RX ORDER — NAPROXEN SODIUM 220 MG/1
220 TABLET, FILM COATED ORAL 2 TIMES DAILY WITH MEALS
COMMUNITY

## 2025-04-28 RX ORDER — FERROUS SULFATE 325(65) MG
325 TABLET ORAL
COMMUNITY

## 2025-04-28 RX ORDER — TIZANIDINE 2 MG/1
2 TABLET ORAL 3 TIMES DAILY PRN
Qty: 60 TABLET | Refills: 3 | Status: SHIPPED | OUTPATIENT
Start: 2025-04-28

## 2025-04-28 ASSESSMENT — ANXIETY QUESTIONNAIRES
GAD7 TOTAL SCORE: 0
5. BEING SO RESTLESS THAT IT IS HARD TO SIT STILL: NOT AT ALL
7. FEELING AFRAID AS IF SOMETHING AWFUL MIGHT HAPPEN: NOT AT ALL
2. NOT BEING ABLE TO STOP OR CONTROL WORRYING: NOT AT ALL
1. FEELING NERVOUS, ANXIOUS, OR ON EDGE: NOT AT ALL
3. WORRYING TOO MUCH ABOUT DIFFERENT THINGS: NOT AT ALL
GAD7 TOTAL SCORE: 0
6. BECOMING EASILY ANNOYED OR IRRITABLE: NOT AT ALL

## 2025-04-28 ASSESSMENT — PAIN SCALES - GENERAL: PAINLEVEL_OUTOF10: MODERATE PAIN (4)

## 2025-04-28 ASSESSMENT — PATIENT HEALTH QUESTIONNAIRE - PHQ9
SUM OF ALL RESPONSES TO PHQ QUESTIONS 1-9: 1
5. POOR APPETITE OR OVEREATING: NOT AT ALL

## 2025-04-28 ASSESSMENT — ENCOUNTER SYMPTOMS: BACK PAIN: 1

## 2025-04-28 NOTE — PATIENT INSTRUCTIONS
Utilize heat, hydrotherapy, tylenol as needed, and also muscle relaxant tizanidine as needed.     Let me know if wish to see a physical therapist.
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
## 2025-07-28 ENCOUNTER — MYC MEDICAL ADVICE (OUTPATIENT)
Dept: FAMILY MEDICINE | Facility: CLINIC | Age: 85
End: 2025-07-28
Payer: MEDICARE

## 2025-07-28 DIAGNOSIS — C61 PROSTATE CANCER (H): Primary | ICD-10-CM

## 2025-08-11 ENCOUNTER — TELEPHONE (OUTPATIENT)
Dept: FAMILY MEDICINE | Facility: CLINIC | Age: 85
End: 2025-08-11
Payer: MEDICARE

## 2025-08-12 ENCOUNTER — ALLIED HEALTH/NURSE VISIT (OUTPATIENT)
Dept: FAMILY MEDICINE | Facility: CLINIC | Age: 85
End: 2025-08-12
Payer: MEDICARE

## 2025-08-12 ENCOUNTER — HOSPITAL ENCOUNTER (EMERGENCY)
Facility: CLINIC | Age: 85
Discharge: HOME | End: 2025-08-12
Attending: EMERGENCY MEDICINE
Payer: MEDICARE

## 2025-08-12 VITALS
HEIGHT: 69 IN | DIASTOLIC BLOOD PRESSURE: 77 MMHG | BODY MASS INDEX: 34.07 KG/M2 | TEMPERATURE: 97.9 F | SYSTOLIC BLOOD PRESSURE: 138 MMHG | RESPIRATION RATE: 16 BRPM | WEIGHT: 230 LBS | OXYGEN SATURATION: 98 % | HEART RATE: 65 BPM

## 2025-08-12 DIAGNOSIS — R29.90 NEUROLOGICAL SYMPTOMS: Primary | ICD-10-CM

## 2025-08-12 DIAGNOSIS — G51.0 LEFT-SIDED BELL'S PALSY: Primary | ICD-10-CM

## 2025-08-12 PROCEDURE — 99284 EMERGENCY DEPT VISIT MOD MDM: CPT | Performed by: EMERGENCY MEDICINE

## 2025-08-12 PROCEDURE — 99207 PR NO CHARGE NURSE ONLY: CPT

## 2025-08-12 RX ORDER — VALACYCLOVIR HYDROCHLORIDE 1 G/1
1000 TABLET, FILM COATED ORAL 3 TIMES DAILY
Qty: 21 TABLET | Refills: 0 | Status: SHIPPED | OUTPATIENT
Start: 2025-08-12 | End: 2025-08-19

## 2025-08-12 RX ORDER — PREDNISONE 20 MG/1
TABLET ORAL
Qty: 21 TABLET | Refills: 0 | Status: SHIPPED | OUTPATIENT
Start: 2025-08-12

## 2025-08-12 ASSESSMENT — COLUMBIA-SUICIDE SEVERITY RATING SCALE - C-SSRS
1. IN THE PAST MONTH, HAVE YOU WISHED YOU WERE DEAD OR WISHED YOU COULD GO TO SLEEP AND NOT WAKE UP?: NO
2. HAVE YOU ACTUALLY HAD ANY THOUGHTS OF KILLING YOURSELF IN THE PAST MONTH?: NO
6. HAVE YOU EVER DONE ANYTHING, STARTED TO DO ANYTHING, OR PREPARED TO DO ANYTHING TO END YOUR LIFE?: NO

## 2025-08-12 ASSESSMENT — ACTIVITIES OF DAILY LIVING (ADL): ADLS_ACUITY_SCORE: 41

## 2025-08-20 ENCOUNTER — OFFICE VISIT (OUTPATIENT)
Dept: FAMILY MEDICINE | Facility: CLINIC | Age: 85
End: 2025-08-20
Payer: MEDICARE

## 2025-08-20 VITALS
DIASTOLIC BLOOD PRESSURE: 65 MMHG | TEMPERATURE: 99.4 F | RESPIRATION RATE: 18 BRPM | SYSTOLIC BLOOD PRESSURE: 119 MMHG | BODY MASS INDEX: 30.75 KG/M2 | HEART RATE: 74 BPM | HEIGHT: 69 IN | WEIGHT: 207.6 LBS | OXYGEN SATURATION: 96 %

## 2025-08-20 DIAGNOSIS — G51.0 BELL'S PALSY: Primary | ICD-10-CM

## 2025-08-20 PROCEDURE — 3078F DIAST BP <80 MM HG: CPT | Performed by: FAMILY MEDICINE

## 2025-08-20 PROCEDURE — 3074F SYST BP LT 130 MM HG: CPT | Performed by: FAMILY MEDICINE

## 2025-08-20 PROCEDURE — 99213 OFFICE O/P EST LOW 20 MIN: CPT | Performed by: FAMILY MEDICINE

## 2025-08-20 PROCEDURE — 1126F AMNT PAIN NOTED NONE PRSNT: CPT | Performed by: FAMILY MEDICINE

## 2025-08-20 ASSESSMENT — PAIN SCALES - GENERAL: PAINLEVEL_OUTOF10: NO PAIN (0)

## (undated) DEVICE — ENDO FORCEP ENDOJAW BIOPSY 3.7MMX230CM FB-222U

## (undated) DEVICE — ENDO SNARE EXACTO COLD 9MM LOOP 2.4MMX230CM 00711115

## (undated) RX ORDER — BUPIVACAINE HYDROCHLORIDE 2.5 MG/ML
INJECTION, SOLUTION EPIDURAL; INFILTRATION; INTRACAUDAL
Status: DISPENSED
Start: 2017-04-20

## (undated) RX ORDER — PROPOFOL 10 MG/ML
INJECTION, EMULSION INTRAVENOUS
Status: DISPENSED
Start: 2023-04-24

## (undated) RX ORDER — METHYLPREDNISOLONE ACETATE 40 MG/ML
INJECTION, SUSPENSION INTRA-ARTICULAR; INTRALESIONAL; INTRAMUSCULAR; SOFT TISSUE
Status: DISPENSED
Start: 2017-04-20

## (undated) RX ORDER — PROPOFOL 10 MG/ML
INJECTION, EMULSION INTRAVENOUS
Status: DISPENSED
Start: 2022-03-14

## (undated) RX ORDER — GLYCOPYRROLATE 0.2 MG/ML
INJECTION, SOLUTION INTRAMUSCULAR; INTRAVENOUS
Status: DISPENSED
Start: 2018-01-12